# Patient Record
Sex: MALE | Race: OTHER | Employment: FULL TIME | URBAN - METROPOLITAN AREA
[De-identification: names, ages, dates, MRNs, and addresses within clinical notes are randomized per-mention and may not be internally consistent; named-entity substitution may affect disease eponyms.]

---

## 2017-01-04 NOTE — TELEPHONE ENCOUNTER
LOV  08/30/2016    Last refill    allopurinol 300 MG Oral Tab 90 tablet 1 8/2/2016       Sig :  Take 1 tablet (300 mg total) by mouth once daily.       Route:   Oral       Medication pending. Please advise. Denys Mendoza No future appointments.

## 2017-01-06 RX ORDER — ALLOPURINOL 300 MG/1
TABLET ORAL
Qty: 90 TABLET | Refills: 0 | Status: SHIPPED | OUTPATIENT
Start: 2017-01-06 | End: 2017-10-27

## 2017-02-07 RX ORDER — METHYLPREDNISOLONE 4 MG/1
TABLET ORAL
Qty: 21 TABLET | Refills: 0 | OUTPATIENT
Start: 2017-02-07

## 2017-02-07 NOTE — TELEPHONE ENCOUNTER
Pt stated he was not currently sick and did not need this medication refilled. He stated this request must have been made in error.

## 2017-02-28 RX ORDER — TRAZODONE HYDROCHLORIDE 50 MG/1
TABLET ORAL
Qty: 90 TABLET | Refills: 0 | Status: SHIPPED | OUTPATIENT
Start: 2017-02-28 | End: 2017-10-27

## 2017-02-28 NOTE — TELEPHONE ENCOUNTER
LOV: 8/30/16 for general medical exam    TRAZODONE HCL 50 MG Oral Tab 90 tablet 0 12/8/2016       Sig :  TAKE 1 TABLET BY MOUTH EVERY NIGHT       Route:   (none)       No future appointments. Please advise.

## 2017-10-27 ENCOUNTER — OFFICE VISIT (OUTPATIENT)
Dept: FAMILY MEDICINE CLINIC | Facility: CLINIC | Age: 57
End: 2017-10-27

## 2017-10-27 VITALS
HEIGHT: 70 IN | RESPIRATION RATE: 16 BRPM | SYSTOLIC BLOOD PRESSURE: 160 MMHG | OXYGEN SATURATION: 97 % | DIASTOLIC BLOOD PRESSURE: 84 MMHG | TEMPERATURE: 98 F | HEART RATE: 74 BPM | BODY MASS INDEX: 30.78 KG/M2 | WEIGHT: 215 LBS

## 2017-10-27 DIAGNOSIS — Z12.11 COLON CANCER SCREENING: ICD-10-CM

## 2017-10-27 DIAGNOSIS — Z00.00 GENERAL MEDICAL EXAM: Primary | ICD-10-CM

## 2017-10-27 PROCEDURE — 82306 VITAMIN D 25 HYDROXY: CPT | Performed by: FAMILY MEDICINE

## 2017-10-27 PROCEDURE — 80061 LIPID PANEL: CPT | Performed by: FAMILY MEDICINE

## 2017-10-27 PROCEDURE — 84153 ASSAY OF PSA TOTAL: CPT | Performed by: FAMILY MEDICINE

## 2017-10-27 PROCEDURE — 80050 GENERAL HEALTH PANEL: CPT | Performed by: FAMILY MEDICINE

## 2017-10-27 PROCEDURE — 99396 PREV VISIT EST AGE 40-64: CPT | Performed by: FAMILY MEDICINE

## 2017-10-27 RX ORDER — TRAZODONE HYDROCHLORIDE 50 MG/1
TABLET ORAL
Qty: 90 TABLET | Refills: 1 | Status: SHIPPED | OUTPATIENT
Start: 2017-10-27 | End: 2018-07-02

## 2017-10-27 RX ORDER — LISINOPRIL 20 MG/1
20 TABLET ORAL
Qty: 90 TABLET | Refills: 1 | Status: SHIPPED | OUTPATIENT
Start: 2017-10-27 | End: 2018-08-09

## 2017-10-27 RX ORDER — AMLODIPINE BESYLATE 5 MG/1
5 TABLET ORAL DAILY
Qty: 90 TABLET | Refills: 1 | Status: SHIPPED | OUTPATIENT
Start: 2017-10-27 | End: 2019-07-26

## 2017-10-27 RX ORDER — ALLOPURINOL 300 MG/1
300 TABLET ORAL
Qty: 90 TABLET | Refills: 1 | Status: SHIPPED | OUTPATIENT
Start: 2017-10-27 | End: 2018-08-09

## 2017-10-27 NOTE — PROGRESS NOTES
Haydee Sher is a 61year old male who presents for a complete physical exam.   HPI:   Pt needs refills on medication. There is no immunization history on file for this patient.   Wt Readings from Last 6 Encounters:  10/27/17 : 215 lb  08/30/16 watches minimally     REVIEW OF SYSTEMS:   GENERAL: feels well otherwise  SKIN: denies any unusual skin lesions  EYES:denies blurred vision or double vision  HEENT: denies nasal congestion, sinus pain or ST  LUNGS: denies shortness of breath with exertion year.

## 2017-12-18 ENCOUNTER — CHARTING TRANS (OUTPATIENT)
Dept: OCCUPATIONAL MEDICINE | Age: 57
End: 2017-12-18

## 2018-07-02 RX ORDER — TRAZODONE HYDROCHLORIDE 50 MG/1
TABLET ORAL
Qty: 90 TABLET | Refills: 0 | Status: SHIPPED | OUTPATIENT
Start: 2018-07-02 | End: 2018-08-09

## 2018-07-02 NOTE — TELEPHONE ENCOUNTER
LOV: 10/27/17 for general medical exam    TraZODone HCl 50 MG Oral Tab 90 tablet 1 10/27/2017    Sig :  TAKE 1 TABLET BY MOUTH EVERY NIGHT     Route:   (none)       No future appointments. Please advise.

## 2018-08-02 ENCOUNTER — TELEPHONE (OUTPATIENT)
Dept: FAMILY MEDICINE CLINIC | Facility: CLINIC | Age: 58
End: 2018-08-02

## 2018-08-02 NOTE — TELEPHONE ENCOUNTER
Pt called he stated [facility] will be contacting Dr. Jonathan Ogden regarding pt's sleep apnea equipment for the machine.

## 2018-08-06 NOTE — TELEPHONE ENCOUNTER
Received fax - order for sleep study supplies  Order requesting face to face notes and sleep study.   Informed patient this order has to go through the provider who completed the sleep study - we do not have any of the sleep study records  Patient states he

## 2018-08-09 ENCOUNTER — OFFICE VISIT (OUTPATIENT)
Dept: FAMILY MEDICINE CLINIC | Facility: CLINIC | Age: 58
End: 2018-08-09
Payer: COMMERCIAL

## 2018-08-09 VITALS
DIASTOLIC BLOOD PRESSURE: 100 MMHG | HEART RATE: 98 BPM | WEIGHT: 229 LBS | SYSTOLIC BLOOD PRESSURE: 146 MMHG | BODY MASS INDEX: 32.78 KG/M2 | OXYGEN SATURATION: 98 % | RESPIRATION RATE: 16 BRPM | HEIGHT: 70 IN | TEMPERATURE: 98 F

## 2018-08-09 DIAGNOSIS — I10 ESSENTIAL HYPERTENSION: Primary | ICD-10-CM

## 2018-08-09 DIAGNOSIS — G47.00 INSOMNIA, UNSPECIFIED TYPE: ICD-10-CM

## 2018-08-09 DIAGNOSIS — M1A.9XX0 CHRONIC GOUT WITHOUT TOPHUS, UNSPECIFIED CAUSE, UNSPECIFIED SITE: ICD-10-CM

## 2018-08-09 LAB
ALBUMIN SERPL-MCNC: 4.2 G/DL (ref 3.5–4.8)
ALBUMIN/GLOB SERPL: 1.1 {RATIO} (ref 1–2)
ALP LIVER SERPL-CCNC: 116 U/L (ref 45–117)
ALT SERPL-CCNC: 39 U/L (ref 17–63)
ANION GAP SERPL CALC-SCNC: 7 MMOL/L (ref 0–18)
AST SERPL-CCNC: 33 U/L (ref 15–41)
BILIRUB SERPL-MCNC: 0.4 MG/DL (ref 0.1–2)
BUN BLD-MCNC: 22 MG/DL (ref 8–20)
BUN/CREAT SERPL: 17.9 (ref 10–20)
CALCIUM BLD-MCNC: 9.2 MG/DL (ref 8.3–10.3)
CHLORIDE SERPL-SCNC: 105 MMOL/L (ref 101–111)
CO2 SERPL-SCNC: 25 MMOL/L (ref 22–32)
CREAT BLD-MCNC: 1.23 MG/DL (ref 0.7–1.3)
GLOBULIN PLAS-MCNC: 3.9 G/DL (ref 2.5–3.7)
GLUCOSE BLD-MCNC: 108 MG/DL (ref 70–99)
M PROTEIN MFR SERPL ELPH: 8.1 G/DL (ref 6.1–8.3)
OSMOLALITY SERPL CALC.SUM OF ELEC: 288 MOSM/KG (ref 275–295)
POTASSIUM SERPL-SCNC: 4.2 MMOL/L (ref 3.6–5.1)
SODIUM SERPL-SCNC: 137 MMOL/L (ref 136–144)
URATE SERPL-MCNC: 8.2 MG/DL (ref 2.4–8.7)

## 2018-08-09 PROCEDURE — 99214 OFFICE O/P EST MOD 30 MIN: CPT | Performed by: FAMILY MEDICINE

## 2018-08-09 PROCEDURE — 84550 ASSAY OF BLOOD/URIC ACID: CPT | Performed by: FAMILY MEDICINE

## 2018-08-09 PROCEDURE — 36415 COLL VENOUS BLD VENIPUNCTURE: CPT | Performed by: FAMILY MEDICINE

## 2018-08-09 PROCEDURE — 80053 COMPREHEN METABOLIC PANEL: CPT | Performed by: FAMILY MEDICINE

## 2018-08-09 RX ORDER — AMLODIPINE BESYLATE 5 MG/1
5 TABLET ORAL DAILY
Qty: 90 TABLET | Refills: 1 | Status: SHIPPED | OUTPATIENT
Start: 2018-08-09 | End: 2019-07-26

## 2018-08-09 RX ORDER — LISINOPRIL 20 MG/1
20 TABLET ORAL
Qty: 90 TABLET | Refills: 1 | Status: SHIPPED | OUTPATIENT
Start: 2018-08-09 | End: 2019-07-26

## 2018-08-09 RX ORDER — TRAZODONE HYDROCHLORIDE 50 MG/1
TABLET ORAL
Qty: 90 TABLET | Refills: 1 | Status: SHIPPED | OUTPATIENT
Start: 2018-08-09 | End: 2019-02-01

## 2018-08-09 RX ORDER — ALLOPURINOL 300 MG/1
300 TABLET ORAL
Qty: 90 TABLET | Refills: 1 | Status: SHIPPED | OUTPATIENT
Start: 2018-08-09 | End: 2019-07-26

## 2018-08-09 NOTE — PROGRESS NOTES
CC: meds check    HPI:     HTN: chronic, stable, albeit uncontrolled at times. Gout: chronic, stable. No major flares. Insomnia: chronic, stable. Uses the trazodone with good relief.      ROS: no cp or sob, no edema      Past Medical History:   Skyla TAKE 1 TABLET BY MOUTH EVERY NIGHT  Dispense: 90 tablet; Refill: 1  - lisinopril 20 MG Oral Tab; Take 1 tablet (20 mg total) by mouth once daily. Dispense: 90 tablet; Refill: 1  - allopurinol 300 MG Oral Tab;  Take 1 tablet (300 mg total) by mouth once paulo

## 2018-08-14 ENCOUNTER — TELEPHONE (OUTPATIENT)
Dept: FAMILY MEDICINE CLINIC | Facility: CLINIC | Age: 58
End: 2018-08-14

## 2018-09-25 ENCOUNTER — TELEPHONE (OUTPATIENT)
Dept: FAMILY MEDICINE CLINIC | Facility: CLINIC | Age: 58
End: 2018-09-25

## 2018-09-27 ENCOUNTER — CHARTING TRANS (OUTPATIENT)
Dept: OTHER | Age: 58
End: 2018-09-27

## 2018-09-27 ASSESSMENT — PAIN SCALES - GENERAL: PAINLEVEL_OUTOF10: 0

## 2018-11-27 VITALS
WEIGHT: 215 LBS | BODY MASS INDEX: 30.1 KG/M2 | HEART RATE: 82 BPM | DIASTOLIC BLOOD PRESSURE: 94 MMHG | SYSTOLIC BLOOD PRESSURE: 148 MMHG | TEMPERATURE: 96.3 F | HEIGHT: 71 IN

## 2019-02-01 DIAGNOSIS — I10 ESSENTIAL HYPERTENSION: ICD-10-CM

## 2019-02-01 DIAGNOSIS — M1A.9XX0 CHRONIC GOUT WITHOUT TOPHUS, UNSPECIFIED CAUSE, UNSPECIFIED SITE: ICD-10-CM

## 2019-02-01 RX ORDER — TRAZODONE HYDROCHLORIDE 50 MG/1
TABLET ORAL
Qty: 90 TABLET | Refills: 0 | Status: SHIPPED | OUTPATIENT
Start: 2019-02-01 | End: 2019-07-26

## 2019-02-01 NOTE — TELEPHONE ENCOUNTER
LOV: 8/9/18 for HTN    TraZODone HCl 50 MG Oral Tab 90 tablet 1 8/9/2018    Sig :  TAKE 1 TABLET BY MOUTH EVERY NIGHT     Route:   (none)       No future appointments. Please advise.

## 2019-03-05 VITALS
OXYGEN SATURATION: 95 % | SYSTOLIC BLOOD PRESSURE: 151 MMHG | RESPIRATION RATE: 18 BRPM | HEART RATE: 94 BPM | TEMPERATURE: 98 F | DIASTOLIC BLOOD PRESSURE: 79 MMHG

## 2019-03-06 ENCOUNTER — PATIENT OUTREACH (OUTPATIENT)
Dept: FAMILY MEDICINE CLINIC | Facility: CLINIC | Age: 59
End: 2019-03-06

## 2019-05-15 DIAGNOSIS — I10 ESSENTIAL HYPERTENSION: ICD-10-CM

## 2019-05-15 DIAGNOSIS — M1A.9XX0 CHRONIC GOUT WITHOUT TOPHUS, UNSPECIFIED CAUSE, UNSPECIFIED SITE: ICD-10-CM

## 2019-05-16 DIAGNOSIS — M1A.9XX0 CHRONIC GOUT WITHOUT TOPHUS, UNSPECIFIED CAUSE, UNSPECIFIED SITE: ICD-10-CM

## 2019-05-16 DIAGNOSIS — I10 ESSENTIAL HYPERTENSION: ICD-10-CM

## 2019-05-17 RX ORDER — TRAZODONE HYDROCHLORIDE 50 MG/1
TABLET ORAL
Qty: 90 TABLET | Refills: 0 | OUTPATIENT
Start: 2019-05-17

## 2019-05-17 RX ORDER — LISINOPRIL 20 MG/1
TABLET ORAL
Qty: 90 TABLET | Refills: 0 | OUTPATIENT
Start: 2019-05-17

## 2019-05-26 NOTE — TELEPHONE ENCOUNTER
Left message regarding paperwork patient dropped off for cpap supplies. Patient needs to get office notes and sleep study results from the doctor who did sleep study.      We cannot complete these forms     Order needs to be done by that doctor
Per patient, forms have already been competed, his CPAP machine being delivered tomorrow.
Pt dropped off paperwork needing completion for CPAP supplies.
chest pain

## 2019-07-10 ENCOUNTER — PATIENT OUTREACH (OUTPATIENT)
Dept: FAMILY MEDICINE CLINIC | Facility: CLINIC | Age: 59
End: 2019-07-10

## 2019-07-10 NOTE — PROGRESS NOTES
This patient is on Dr. David Velazquez hypertension list. Steve Carcamo the patient is due for a follow up with Dr. Lisa Matthews, please call to schedule.  If the patient is due for a nurse appointment for a b/p check, please contact the patient to schedule.  Please send the c

## 2019-07-26 ENCOUNTER — OFFICE VISIT (OUTPATIENT)
Dept: FAMILY MEDICINE CLINIC | Facility: CLINIC | Age: 59
End: 2019-07-26
Payer: COMMERCIAL

## 2019-07-26 VITALS
RESPIRATION RATE: 16 BRPM | HEART RATE: 72 BPM | DIASTOLIC BLOOD PRESSURE: 88 MMHG | WEIGHT: 217 LBS | TEMPERATURE: 98 F | SYSTOLIC BLOOD PRESSURE: 130 MMHG | BODY MASS INDEX: 31.07 KG/M2 | HEIGHT: 70 IN

## 2019-07-26 DIAGNOSIS — Z00.00 GENERAL MEDICAL EXAM: Primary | ICD-10-CM

## 2019-07-26 DIAGNOSIS — I10 ESSENTIAL HYPERTENSION: ICD-10-CM

## 2019-07-26 DIAGNOSIS — M1A.9XX0 CHRONIC GOUT WITHOUT TOPHUS, UNSPECIFIED CAUSE, UNSPECIFIED SITE: ICD-10-CM

## 2019-07-26 LAB
ALBUMIN SERPL-MCNC: 4.1 G/DL (ref 3.4–5)
ALBUMIN/GLOB SERPL: 1 {RATIO} (ref 1–2)
ALP LIVER SERPL-CCNC: 85 U/L (ref 45–117)
ALT SERPL-CCNC: 57 U/L (ref 16–61)
ANION GAP SERPL CALC-SCNC: 7 MMOL/L (ref 0–18)
AST SERPL-CCNC: 42 U/L (ref 15–37)
BILIRUB SERPL-MCNC: 1 MG/DL (ref 0.1–2)
BUN BLD-MCNC: 20 MG/DL (ref 7–18)
BUN/CREAT SERPL: 15.6 (ref 10–20)
CALCIUM BLD-MCNC: 9.7 MG/DL (ref 8.5–10.1)
CHLORIDE SERPL-SCNC: 103 MMOL/L (ref 98–112)
CHOLEST SMN-MCNC: 154 MG/DL (ref ?–200)
CK SERPL-CCNC: 141 U/L (ref 39–308)
CO2 SERPL-SCNC: 27 MMOL/L (ref 21–32)
COMPLEXED PSA SERPL-MCNC: 1.63 NG/ML (ref ?–4)
CREAT BLD-MCNC: 1.28 MG/DL (ref 0.7–1.3)
DEPRECATED RDW RBC AUTO: 44.5 FL (ref 35.1–46.3)
ERYTHROCYTE [DISTWIDTH] IN BLOOD BY AUTOMATED COUNT: 12.8 % (ref 11–15)
EST. AVERAGE GLUCOSE BLD GHB EST-MCNC: 103 MG/DL (ref 68–126)
GLOBULIN PLAS-MCNC: 4.3 G/DL (ref 2.8–4.4)
GLUCOSE BLD-MCNC: 82 MG/DL (ref 70–99)
HBA1C MFR BLD HPLC: 5.2 % (ref ?–5.7)
HCT VFR BLD AUTO: 50.7 % (ref 39–53)
HDLC SERPL-MCNC: 73 MG/DL (ref 40–59)
HGB BLD-MCNC: 17.3 G/DL (ref 13–17.5)
LDLC SERPL CALC-MCNC: 67 MG/DL (ref ?–100)
M PROTEIN MFR SERPL ELPH: 8.4 G/DL (ref 6.4–8.2)
MCH RBC QN AUTO: 32.5 PG (ref 26–34)
MCHC RBC AUTO-ENTMCNC: 34.1 G/DL (ref 31–37)
MCV RBC AUTO: 95.1 FL (ref 80–100)
NONHDLC SERPL-MCNC: 81 MG/DL (ref ?–130)
OSMOLALITY SERPL CALC.SUM OF ELEC: 286 MOSM/KG (ref 275–295)
PLATELET # BLD AUTO: 179 10(3)UL (ref 150–450)
POTASSIUM SERPL-SCNC: 4 MMOL/L (ref 3.5–5.1)
RBC # BLD AUTO: 5.33 X10(6)UL (ref 4.3–5.7)
SODIUM SERPL-SCNC: 137 MMOL/L (ref 136–145)
TRIGL SERPL-MCNC: 72 MG/DL (ref 30–149)
TSI SER-ACNC: 1.24 MIU/ML (ref 0.36–3.74)
VIT D+METAB SERPL-MCNC: 26.3 NG/ML (ref 30–100)
VLDLC SERPL CALC-MCNC: 14 MG/DL (ref 0–30)
WBC # BLD AUTO: 7.2 X10(3) UL (ref 4–11)

## 2019-07-26 PROCEDURE — 84153 ASSAY OF PSA TOTAL: CPT | Performed by: FAMILY MEDICINE

## 2019-07-26 PROCEDURE — 83036 HEMOGLOBIN GLYCOSYLATED A1C: CPT | Performed by: FAMILY MEDICINE

## 2019-07-26 PROCEDURE — 82550 ASSAY OF CK (CPK): CPT | Performed by: FAMILY MEDICINE

## 2019-07-26 PROCEDURE — 80050 GENERAL HEALTH PANEL: CPT | Performed by: FAMILY MEDICINE

## 2019-07-26 PROCEDURE — 80061 LIPID PANEL: CPT | Performed by: FAMILY MEDICINE

## 2019-07-26 PROCEDURE — 82306 VITAMIN D 25 HYDROXY: CPT | Performed by: FAMILY MEDICINE

## 2019-07-26 PROCEDURE — 99396 PREV VISIT EST AGE 40-64: CPT | Performed by: FAMILY MEDICINE

## 2019-07-26 RX ORDER — LISINOPRIL 20 MG/1
20 TABLET ORAL
Qty: 90 TABLET | Refills: 1 | Status: SHIPPED | OUTPATIENT
Start: 2019-07-26 | End: 2020-01-24

## 2019-07-26 RX ORDER — TRAZODONE HYDROCHLORIDE 50 MG/1
TABLET ORAL
Qty: 90 TABLET | Refills: 1 | Status: SHIPPED | OUTPATIENT
Start: 2019-07-26 | End: 2020-01-24

## 2019-07-26 RX ORDER — AMLODIPINE BESYLATE 5 MG/1
5 TABLET ORAL DAILY
Qty: 90 TABLET | Refills: 1 | Status: SHIPPED | OUTPATIENT
Start: 2019-07-26 | End: 2020-01-24

## 2019-07-26 RX ORDER — ALLOPURINOL 300 MG/1
300 TABLET ORAL
Qty: 90 TABLET | Refills: 1 | Status: SHIPPED | OUTPATIENT
Start: 2019-07-26 | End: 2020-01-30 | Stop reason: ALTCHOICE

## 2019-07-26 NOTE — PROGRESS NOTES
Kisha Rodrigez is a 60year old male who presents for a complete physical exam.   HPI:   Pt generally doing well. No complaints. There is no immunization history on file for this patient.   Wt Readings from Last 6 Encounters:  07/26/19 : 217 lb  08/ MG Oral Tab Take 1 tablet (5 mg total) by mouth daily. Disp: 90 tablet Rfl: 1      Past Medical History:   Diagnosis Date   • Allergic conjunctivitis    • Gout    • Unspecified essential hypertension       History reviewed. No pertinent surgical history. times three,cranial nerves are intact,motor and sensory are grossly intact    ASSESSMENT AND PLAN:   Argenis Rodriguez is a 60year old male who presents for a complete physical exam. Pt's BMI is Body mass index is 31.14 kg/m². , recommended low sugar diet

## 2019-10-17 ENCOUNTER — OFFICE VISIT (OUTPATIENT)
Dept: FAMILY MEDICINE CLINIC | Facility: CLINIC | Age: 59
End: 2019-10-17
Payer: COMMERCIAL

## 2019-10-17 VITALS
RESPIRATION RATE: 18 BRPM | OXYGEN SATURATION: 98 % | BODY MASS INDEX: 31.35 KG/M2 | WEIGHT: 219 LBS | DIASTOLIC BLOOD PRESSURE: 86 MMHG | HEIGHT: 70 IN | HEART RATE: 75 BPM | TEMPERATURE: 98 F | SYSTOLIC BLOOD PRESSURE: 134 MMHG

## 2019-10-17 DIAGNOSIS — M70.52 INFRAPATELLAR BURSITIS OF LEFT KNEE: ICD-10-CM

## 2019-10-17 DIAGNOSIS — R74.8 ELEVATED LIVER ENZYMES: Primary | ICD-10-CM

## 2019-10-17 DIAGNOSIS — M1A.9XX0 CHRONIC GOUT WITHOUT TOPHUS, UNSPECIFIED CAUSE, UNSPECIFIED SITE: ICD-10-CM

## 2019-10-17 PROCEDURE — 99214 OFFICE O/P EST MOD 30 MIN: CPT | Performed by: FAMILY MEDICINE

## 2019-10-17 NOTE — PROGRESS NOTES
CC: Multiple issues    HPI:     1. Elevated liver enzymes  Patient has little elevated liver enzymes. Mild elevation. This is the first time since I am seeing him since 2015 that they have been elevated.   He admits to increased alcohol use over the last hypertension        Social History    Tobacco Use      Smoking status: Never Smoker      Smokeless tobacco: Never Used    Alcohol use: Yes      Frequency: 2-3 times a week      Drinks per session: 1 or 2      Binge frequency: Never    Drug use: No      EXA

## 2019-10-28 ENCOUNTER — TELEPHONE (OUTPATIENT)
Dept: FAMILY MEDICINE CLINIC | Facility: CLINIC | Age: 59
End: 2019-10-28

## 2019-10-28 RX ORDER — INDOMETHACIN 50 MG/1
50 CAPSULE ORAL 2 TIMES DAILY WITH MEALS
Qty: 10 CAPSULE | Refills: 1 | Status: SHIPPED | OUTPATIENT
Start: 2019-10-28 | End: 2020-01-10

## 2019-10-28 NOTE — TELEPHONE ENCOUNTER
LOV 10/17/19 for gout. Called pt to get more information. Pt states he takes this PRN for a gout flare. He does not currently have symptoms. He has never taken this Rx before. Please advise.     Future Appointments   Date Time Provider Department Jayson

## 2019-10-28 NOTE — TELEPHONE ENCOUNTER
Pt called stated stated he needs a rx for gout it is called Indomethacin. He stated Dr. Madeline Stewart would prescribe this for him. Would like [facility] [address] [place].

## 2019-11-02 RX ORDER — INDOMETHACIN 50 MG/1
CAPSULE ORAL
Qty: 10 CAPSULE | Refills: 0 | OUTPATIENT
Start: 2019-11-02

## 2019-11-04 ENCOUNTER — HOSPITAL ENCOUNTER (OUTPATIENT)
Dept: ULTRASOUND IMAGING | Age: 59
Discharge: HOME | End: 2019-11-04
Attending: FAMILY MEDICINE
Payer: COMMERCIAL

## 2019-11-04 DIAGNOSIS — R74.8 ELEVATED LIVER ENZYMES: ICD-10-CM

## 2019-11-04 PROCEDURE — 76700 US EXAM ABDOM COMPLETE: CPT | Performed by: FAMILY MEDICINE

## 2019-11-05 ENCOUNTER — TELEPHONE (OUTPATIENT)
Dept: FAMILY MEDICINE CLINIC | Facility: CLINIC | Age: 59
End: 2019-11-05

## 2019-11-05 NOTE — TELEPHONE ENCOUNTER
----- Message from Felipe Weller DO sent at 11/5/2019  7:50 AM CST -----  Results reviewed. Please inform patient ultrasound confirms fatty liver. Needs lower carbohydrate diet and increased exercise for weight loss of 10% over the next year.   Also some g

## 2019-11-19 ENCOUNTER — OFFICE VISIT (OUTPATIENT)
Dept: SURGERY | Facility: CLINIC | Age: 59
End: 2019-11-19
Payer: COMMERCIAL

## 2019-11-19 VITALS — HEART RATE: 78 BPM | TEMPERATURE: 98 F | WEIGHT: 219 LBS | HEIGHT: 70 IN | BODY MASS INDEX: 31.35 KG/M2

## 2019-11-19 DIAGNOSIS — Z12.11 SCREEN FOR COLON CANCER: Primary | ICD-10-CM

## 2019-11-19 PROCEDURE — S0285 CNSLT BEFORE SCREEN COLONOSC: HCPCS | Performed by: SURGERY

## 2019-11-19 NOTE — H&P
Lucinda Love is a 60year old male  Patient presents with:  Colonoscopy: New patient referred by Dr. Waqas Saavedra for colonoscopy consult. Pt has never had a colonoscopy. Denies any family hx colon cancer or polyps.        REFERRED BY    Patient presents w constipation, diarrhea; no rectal bleeding; no heartburn  GENITAL/: no dysuria, urgency or frequency, no tea colored urine  MUSCULOSKELETAL: no joint complaints upper or lower extremities  HEMATOLOGY: denies hx anemia; denies bruising or excessive bleedi Final   • Anion Gap 07/26/2019 7  0 - 18 mmol/L Final   • BUN 07/26/2019 20* 7 - 18 mg/dL Final   • Creatinine 07/26/2019 1.28  0.70 - 1.30 mg/dL Final   • BUN/CREA Ratio 07/26/2019 15.6  10.0 - 20.0 Final   • Calcium, Total 07/26/2019 9.7  8.5 - 10.1 mg/d mg/dL  Very High: >=500 mg/dL         • LDL Cholesterol 07/26/2019 67  <100 mg/dL Final      Desirable <100 mg/dL   Borderline 100-129 mg/dL   High     >=130mg/dL       • VLDL 07/26/2019 14  0 - 30 mg/dL Final   • Non HDL Chol 07/26/2019 81  <130 mg/dL Fin supplementation to stop biotin consumption at least 72 hours prior to collection of a new sample.    • Vitamin D, 25OH, Total 07/26/2019 26.3* 30.0 - 100.0 ng/mL Final      Literature Recommendations for 25(OH)D levels are:  Range           Vitamin D Status

## 2019-12-20 ENCOUNTER — LAB REQUISITION (OUTPATIENT)
Dept: LAB | Facility: HOSPITAL | Age: 59
End: 2019-12-20
Payer: COMMERCIAL

## 2019-12-20 ENCOUNTER — TELEPHONE (OUTPATIENT)
Dept: SURGERY | Facility: CLINIC | Age: 59
End: 2019-12-20

## 2019-12-20 DIAGNOSIS — K38.8 MASS OF APPENDIX: Primary | ICD-10-CM

## 2019-12-20 DIAGNOSIS — Z12.11 ENCOUNTER FOR SCREENING FOR MALIGNANT NEOPLASM OF COLON: ICD-10-CM

## 2019-12-20 PROCEDURE — 88305 TISSUE EXAM BY PATHOLOGIST: CPT | Performed by: SURGERY

## 2019-12-20 NOTE — TELEPHONE ENCOUNTER
Pt with invaginated appendix noted at time of colonoscopy   I am concerned Re poss mucocele of the appendix

## 2020-01-02 ENCOUNTER — TELEPHONE (OUTPATIENT)
Dept: SURGERY | Facility: CLINIC | Age: 60
End: 2020-01-02

## 2020-01-02 NOTE — TELEPHONE ENCOUNTER
CT Abdomen + Pelvis CPT 49486 approved by insurance. [address] Baltazar #[id number]    Called patient to inform him. Voicemail full, unable to leave msg.

## 2020-01-03 NOTE — PROGRESS NOTES
Called pt and no answer, unable to leave message. CT order has been placed by another RN and approved by insurance. Pt needs to be contacted and needs to complete before it expires.

## 2020-01-09 ENCOUNTER — TELEPHONE (OUTPATIENT)
Dept: FAMILY MEDICINE CLINIC | Facility: CLINIC | Age: 60
End: 2020-01-09

## 2020-01-09 NOTE — TELEPHONE ENCOUNTER
Refill request received yesterday for this medication and routed to [facility] to address.

## 2020-01-09 NOTE — TELEPHONE ENCOUNTER
Last refilled on 10/28/19 for # 10 with 1 refills  Last OV 10/17/19 for elevated LFTs  No future appointments. Thank you.

## 2020-01-10 RX ORDER — INDOMETHACIN 50 MG/1
CAPSULE ORAL
Qty: 10 CAPSULE | Refills: 1 | OUTPATIENT
Start: 2020-01-10

## 2020-01-10 RX ORDER — INDOMETHACIN 50 MG/1
CAPSULE ORAL
Qty: 10 CAPSULE | Refills: 1 | Status: SHIPPED | OUTPATIENT
Start: 2020-01-10 | End: 2020-05-19

## 2020-01-10 NOTE — TELEPHONE ENCOUNTER
LOV: 10/17/19 for chronic gout / elevated liver enzymes     indomethacin 50 MG Oral Cap () 10 capsule 1 10/28/2019 2019   Sig:   Take 1 capsule (50 mg total) by mouth 2 (two) times daily with meals for 5 days.  As needed for gout flare up sympto

## 2020-01-14 RX ORDER — INDOMETHACIN 50 MG/1
CAPSULE ORAL
Qty: 10 CAPSULE | Refills: 1 | OUTPATIENT
Start: 2020-01-14

## 2020-01-14 NOTE — TELEPHONE ENCOUNTER
Indomethacin refilled 1/10/20 #10 with 1 refill.  Need to call pharmacy when they open to see if recieved

## 2020-01-14 NOTE — TELEPHONE ENCOUNTER
Called [facility] and was advised that this was received and 1 refill left on file.  Refusing this request.

## 2020-01-20 ENCOUNTER — TELEPHONE (OUTPATIENT)
Dept: SURGERY | Facility: CLINIC | Age: 60
End: 2020-01-20

## 2020-01-20 NOTE — PROGRESS NOTES
Left detailed msg that CT order was placed, that doctor feels this is an important test and that it is authorized until 2/16/20

## 2020-01-24 ENCOUNTER — HOSPITAL ENCOUNTER (OUTPATIENT)
Dept: CT IMAGING | Age: 60
Discharge: HOME | End: 2020-01-24
Attending: SURGERY
Payer: COMMERCIAL

## 2020-01-24 ENCOUNTER — TELEPHONE (OUTPATIENT)
Dept: SURGERY | Facility: CLINIC | Age: 60
End: 2020-01-24

## 2020-01-24 DIAGNOSIS — K38.8 MASS OF APPENDIX: ICD-10-CM

## 2020-01-24 DIAGNOSIS — I10 ESSENTIAL HYPERTENSION: ICD-10-CM

## 2020-01-24 DIAGNOSIS — M1A.9XX0 CHRONIC GOUT WITHOUT TOPHUS, UNSPECIFIED CAUSE, UNSPECIFIED SITE: ICD-10-CM

## 2020-01-24 LAB — CREAT BLD-MCNC: 1.1 MG/DL (ref 0.7–1.3)

## 2020-01-24 PROCEDURE — 82565 ASSAY OF CREATININE: CPT

## 2020-01-24 PROCEDURE — 74177 CT ABD & PELVIS W/CONTRAST: CPT | Performed by: SURGERY

## 2020-01-24 RX ORDER — AMLODIPINE BESYLATE 5 MG/1
TABLET ORAL
Qty: 90 TABLET | Refills: 1 | Status: SHIPPED | OUTPATIENT
Start: 2020-01-24 | End: 2020-01-30 | Stop reason: ALTCHOICE

## 2020-01-24 RX ORDER — TRAZODONE HYDROCHLORIDE 50 MG/1
TABLET ORAL
Qty: 90 TABLET | Refills: 0 | Status: SHIPPED | OUTPATIENT
Start: 2020-01-24 | End: 2020-05-19

## 2020-01-24 RX ORDER — LISINOPRIL 20 MG/1
TABLET ORAL
Qty: 90 TABLET | Refills: 1 | Status: SHIPPED | OUTPATIENT
Start: 2020-01-24 | End: 2020-05-19

## 2020-01-24 NOTE — TELEPHONE ENCOUNTER
LOV: 10/17/19 for elevated liver enzymes  Patient passes protocol for HTN meds  Rx filled. CMP: 7/26/19     amLODIPine Besylate 5 MG Oral Tab 90 tablet 1 7/26/2019    Sig:   Take 1 tablet (5 mg total) by mouth daily.        lisinopril 20 MG Oral Tab 90 tab

## 2020-01-24 NOTE — TELEPHONE ENCOUNTER
LOV: 10/17/19 for elevated liver enzymes    traZODone HCl 50 MG Oral Tab 90 tablet 1 2019    Si tab po nightly       Future Appointments   Date Time Provider Sherly Encarnacion   2020  3:00 PM OS CT RM 1 OS CT [place]     Please advise.

## 2020-01-24 NOTE — TELEPHONE ENCOUNTER
Insurance verification left message with MA regarding prior authorization needed. Referral updated via [employer]. Copy printed and placed in scanning.     Authorization Number: [id number]  Case Number: [id number]  Status: Approved  Approval Date: 1/2/2020 1

## 2020-01-28 ENCOUNTER — TELEPHONE (OUTPATIENT)
Dept: SURGERY | Facility: CLINIC | Age: 60
End: 2020-01-28

## 2020-01-29 ENCOUNTER — TELEPHONE (OUTPATIENT)
Dept: SURGERY | Facility: CLINIC | Age: 60
End: 2020-01-29

## 2020-01-29 DIAGNOSIS — K38.8 MASS OF APPENDIX: Primary | ICD-10-CM

## 2020-01-31 ENCOUNTER — TELEPHONE (OUTPATIENT)
Dept: SURGERY | Facility: CLINIC | Age: 60
End: 2020-01-31

## 2020-01-31 NOTE — TELEPHONE ENCOUNTER
Called pt and went over surgery checklist for upcoming procedure with RWM on 2/7/2020 at [facility] for lap appey. V/U and no further questions.

## 2020-02-03 ENCOUNTER — TELEPHONE (OUTPATIENT)
Dept: FAMILY MEDICINE CLINIC | Facility: CLINIC | Age: 60
End: 2020-02-03

## 2020-02-03 NOTE — TELEPHONE ENCOUNTER
Hello!  I am wondering if this patient will need a pre-op physical in our office prior to his upcoming procedure? I was not sure based on the fax we received. Thank you!

## 2020-02-04 ENCOUNTER — TELEPHONE (OUTPATIENT)
Dept: FAMILY MEDICINE CLINIC | Facility: CLINIC | Age: 60
End: 2020-02-04

## 2020-02-04 ENCOUNTER — MED REC SCAN ONLY (OUTPATIENT)
Dept: SURGERY | Facility: CLINIC | Age: 60
End: 2020-02-04

## 2020-02-04 NOTE — TELEPHONE ENCOUNTER
Pt is scheduled for surgery on 2/7,   Ashli Cruz @ [facility] preadmin needs labs &  EKG states she cant see in Epic, please fax to    [phone]  . [phone]

## 2020-02-04 NOTE — TELEPHONE ENCOUNTER
Nestor Blair from Dr Debbie Bradford office, we are not requiring any additional testing or medical clearance. However I do see a letter from Pre Admission Testing requiring some testing per PAT/Anesthesia.  The letter asks for :    EKG READ AND SIGNED WITHIN 90 days

## 2020-02-04 NOTE — TELEPHONE ENCOUNTER
See tel enc from 2/3/20. Call to patient and advised to call:    [facility] Phone: [phone]  Fax: [phone]    Verbalized understanding.

## 2020-02-05 NOTE — TELEPHONE ENCOUNTER
Do you want to see him prior to this procedure? If not, schedule an RN visit here for the labs and EKG or have pt go to [facility]?

## 2020-02-06 ENCOUNTER — APPOINTMENT (OUTPATIENT)
Dept: LAB | Age: 60
End: 2020-02-06
Attending: SURGERY
Payer: COMMERCIAL

## 2020-02-06 ENCOUNTER — ANESTHESIA EVENT (OUTPATIENT)
Dept: SURGERY | Facility: HOSPITAL | Age: 60
End: 2020-02-06
Payer: COMMERCIAL

## 2020-02-06 DIAGNOSIS — K38.8 MASS OF APPENDIX: ICD-10-CM

## 2020-02-06 LAB
ANION GAP SERPL CALC-SCNC: 7 MMOL/L (ref 0–18)
ATRIAL RATE: 72 BPM
BUN BLD-MCNC: 15 MG/DL (ref 7–18)
BUN/CREAT SERPL: 13.3 (ref 10–20)
CALCIUM BLD-MCNC: 8.9 MG/DL (ref 8.5–10.1)
CHLORIDE SERPL-SCNC: 106 MMOL/L (ref 98–112)
CO2 SERPL-SCNC: 23 MMOL/L (ref 21–32)
CREAT BLD-MCNC: 1.13 MG/DL (ref 0.7–1.3)
GLUCOSE BLD-MCNC: 102 MG/DL (ref 70–99)
OSMOLALITY SERPL CALC.SUM OF ELEC: 283 MOSM/KG (ref 275–295)
P-R INTERVAL: 164 MS
PATIENT FASTING Y/N/NP: NO
POTASSIUM SERPL-SCNC: 3.9 MMOL/L (ref 3.5–5.1)
Q-T INTERVAL: 388 MS
QRS DURATION: 88 MS
QTC CALCULATION (BEZET): 424 MS
R AXIS: 201 DEGREES
SODIUM SERPL-SCNC: 136 MMOL/L (ref 136–145)
T AXIS: 67 DEGREES
VENTRICULAR RATE: 72 BPM

## 2020-02-06 PROCEDURE — 80048 BASIC METABOLIC PNL TOTAL CA: CPT

## 2020-02-06 PROCEDURE — 36415 COLL VENOUS BLD VENIPUNCTURE: CPT

## 2020-02-06 PROCEDURE — 93005 ELECTROCARDIOGRAM TRACING: CPT

## 2020-02-06 PROCEDURE — 93010 ELECTROCARDIOGRAM REPORT: CPT | Performed by: INTERNAL MEDICINE

## 2020-02-07 ENCOUNTER — HOSPITAL ENCOUNTER (OUTPATIENT)
Facility: HOSPITAL | Age: 60
Setting detail: HOSPITAL OUTPATIENT SURGERY
Discharge: HOME | End: 2020-02-07
Attending: SURGERY | Admitting: SURGERY
Payer: COMMERCIAL

## 2020-02-07 ENCOUNTER — ANESTHESIA (OUTPATIENT)
Dept: SURGERY | Facility: HOSPITAL | Age: 60
End: 2020-02-07
Payer: COMMERCIAL

## 2020-02-07 VITALS
BODY MASS INDEX: 29.72 KG/M2 | HEART RATE: 63 BPM | DIASTOLIC BLOOD PRESSURE: 79 MMHG | SYSTOLIC BLOOD PRESSURE: 140 MMHG | OXYGEN SATURATION: 96 % | WEIGHT: 212.31 LBS | HEIGHT: 71 IN | TEMPERATURE: 98 F | RESPIRATION RATE: 16 BRPM

## 2020-02-07 DIAGNOSIS — K38.8 MASS OF APPENDIX: Primary | ICD-10-CM

## 2020-02-07 PROCEDURE — 44970 LAPAROSCOPY APPENDECTOMY: CPT | Performed by: SURGERY

## 2020-02-07 PROCEDURE — 0DTJ4ZZ RESECTION OF APPENDIX, PERCUTANEOUS ENDOSCOPIC APPROACH: ICD-10-PCS | Performed by: SURGERY

## 2020-02-07 RX ORDER — SODIUM CHLORIDE, SODIUM LACTATE, POTASSIUM CHLORIDE, CALCIUM CHLORIDE 600; 310; 30; 20 MG/100ML; MG/100ML; MG/100ML; MG/100ML
INJECTION, SOLUTION INTRAVENOUS CONTINUOUS
Status: DISCONTINUED | OUTPATIENT
Start: 2020-02-07 | End: 2020-02-07

## 2020-02-07 RX ORDER — NALOXONE HYDROCHLORIDE 0.4 MG/ML
80 INJECTION, SOLUTION INTRAMUSCULAR; INTRAVENOUS; SUBCUTANEOUS AS NEEDED
Status: DISCONTINUED | OUTPATIENT
Start: 2020-02-07 | End: 2020-02-07

## 2020-02-07 RX ORDER — HYDROCODONE BITARTRATE AND ACETAMINOPHEN 5; 325 MG/1; MG/1
1 TABLET ORAL AS NEEDED
Status: COMPLETED | OUTPATIENT
Start: 2020-02-07 | End: 2020-02-07

## 2020-02-07 RX ORDER — HYDROMORPHONE HYDROCHLORIDE 1 MG/ML
0.4 INJECTION, SOLUTION INTRAMUSCULAR; INTRAVENOUS; SUBCUTANEOUS EVERY 5 MIN PRN
Status: DISCONTINUED | OUTPATIENT
Start: 2020-02-07 | End: 2020-02-07

## 2020-02-07 RX ORDER — HYDROCODONE BITARTRATE AND ACETAMINOPHEN 5; 325 MG/1; MG/1
1 TABLET ORAL EVERY 6 HOURS PRN
Qty: 20 TABLET | Refills: 0 | Status: SHIPPED | OUTPATIENT
Start: 2020-02-07 | End: 2020-02-11

## 2020-02-07 RX ORDER — HEPARIN SODIUM 5000 [USP'U]/ML
5000 INJECTION, SOLUTION INTRAVENOUS; SUBCUTANEOUS ONCE
Status: COMPLETED | OUTPATIENT
Start: 2020-02-07 | End: 2020-02-07

## 2020-02-07 RX ORDER — LIDOCAINE HYDROCHLORIDE 10 MG/ML
INJECTION, SOLUTION EPIDURAL; INFILTRATION; INTRACAUDAL; PERINEURAL AS NEEDED
Status: DISCONTINUED | OUTPATIENT
Start: 2020-02-07 | End: 2020-02-07 | Stop reason: SURG

## 2020-02-07 RX ORDER — NEOSTIGMINE METHYLSULFATE 1 MG/ML
INJECTION INTRAVENOUS AS NEEDED
Status: DISCONTINUED | OUTPATIENT
Start: 2020-02-07 | End: 2020-02-07 | Stop reason: SURG

## 2020-02-07 RX ORDER — ONDANSETRON 2 MG/ML
4 INJECTION INTRAMUSCULAR; INTRAVENOUS AS NEEDED
Status: DISCONTINUED | OUTPATIENT
Start: 2020-02-07 | End: 2020-02-07

## 2020-02-07 RX ORDER — BUPIVACAINE HYDROCHLORIDE 5 MG/ML
INJECTION, SOLUTION EPIDURAL; INTRACAUDAL AS NEEDED
Status: DISCONTINUED | OUTPATIENT
Start: 2020-02-07 | End: 2020-02-07 | Stop reason: HOSPADM

## 2020-02-07 RX ORDER — GLYCOPYRROLATE 0.2 MG/ML
INJECTION, SOLUTION INTRAMUSCULAR; INTRAVENOUS AS NEEDED
Status: DISCONTINUED | OUTPATIENT
Start: 2020-02-07 | End: 2020-02-07 | Stop reason: SURG

## 2020-02-07 RX ORDER — ACETAMINOPHEN 500 MG
1000 TABLET ORAL ONCE
Status: DISCONTINUED | OUTPATIENT
Start: 2020-02-07 | End: 2020-02-07 | Stop reason: HOSPADM

## 2020-02-07 RX ORDER — HYDROCODONE BITARTRATE AND ACETAMINOPHEN 5; 325 MG/1; MG/1
2 TABLET ORAL AS NEEDED
Status: COMPLETED | OUTPATIENT
Start: 2020-02-07 | End: 2020-02-07

## 2020-02-07 RX ORDER — ROCURONIUM BROMIDE 10 MG/ML
INJECTION, SOLUTION INTRAVENOUS AS NEEDED
Status: DISCONTINUED | OUTPATIENT
Start: 2020-02-07 | End: 2020-02-07 | Stop reason: SURG

## 2020-02-07 RX ORDER — ONDANSETRON 2 MG/ML
INJECTION INTRAMUSCULAR; INTRAVENOUS AS NEEDED
Status: DISCONTINUED | OUTPATIENT
Start: 2020-02-07 | End: 2020-02-07 | Stop reason: SURG

## 2020-02-07 RX ORDER — DEXAMETHASONE SODIUM PHOSPHATE 4 MG/ML
VIAL (ML) INJECTION AS NEEDED
Status: DISCONTINUED | OUTPATIENT
Start: 2020-02-07 | End: 2020-02-07 | Stop reason: SURG

## 2020-02-07 RX ADMIN — DEXAMETHASONE SODIUM PHOSPHATE 4 MG: 4 MG/ML VIAL (ML) INJECTION at 16:22:00

## 2020-02-07 RX ADMIN — LIDOCAINE HYDROCHLORIDE 50 MG: 10 INJECTION, SOLUTION EPIDURAL; INFILTRATION; INTRACAUDAL; PERINEURAL at 16:22:00

## 2020-02-07 RX ADMIN — SODIUM CHLORIDE, SODIUM LACTATE, POTASSIUM CHLORIDE, CALCIUM CHLORIDE: 600; 310; 30; 20 INJECTION, SOLUTION INTRAVENOUS at 17:21:00

## 2020-02-07 RX ADMIN — ROCURONIUM BROMIDE 50 MG: 10 INJECTION, SOLUTION INTRAVENOUS at 16:22:00

## 2020-02-07 RX ADMIN — NEOSTIGMINE METHYLSULFATE 3 MG: 1 INJECTION INTRAVENOUS at 17:04:00

## 2020-02-07 RX ADMIN — ONDANSETRON 4 MG: 2 INJECTION INTRAMUSCULAR; INTRAVENOUS at 17:00:00

## 2020-02-07 RX ADMIN — GLYCOPYRROLATE 0.4 MG: 0.2 INJECTION, SOLUTION INTRAMUSCULAR; INTRAVENOUS at 17:04:00

## 2020-02-07 NOTE — ANESTHESIA PREPROCEDURE EVALUATION
PRE-OP EVALUATION    Patient Name: Argenis Rodriguez    Pre-op Diagnosis: Mass of appendix [K38.8]    Procedure(s):  LAPAROSCOPIC APPENDECTOMY    Surgeon(s) and Role:     Annabella Whitehead, DO - Primary    Pre-op vitals reviewed.   Temp: 98.1 °F (36.7 °C)  P 02/06/2020            Airway      Mallampati: II  Mouth opening: 3 FB  TM distance: 4 - 6 cm  Neck ROM: full Cardiovascular      Rhythm: regular  Rate: normal     Dental             Pulmonary      Breath sounds clear to auscultation bilaterally.

## 2020-02-07 NOTE — H&P
Patient presents with request for colonoscopy. Patient denies change in bowel habits he denies blood or mucus in stool states that he has a bowel movement every day he denies laxative use he denies chronic abdominal pain he denies unexplained weight loss. anemia; denies bruising or excessive bleeding  Endocrine: no weight gain or loss no hot or cold intolerance     EXAM      There were no vitals taken for this visit. GENERAL: well developed, well nourished male, in no apparent distress.     MENTAL STATUS :A Final   • Calcium, Total 07/26/2019 9.7  8.5 - 10.1 mg/dL Final   • Calculated Osmolality 07/26/2019 286  275 - 295 mOsm/kg Final   • GFR, Non- 07/26/2019 60  >=60 Final   • GFR, -American 07/26/2019 70  >=60 Final   • AST 07/26/2019 LDL Cholesterol 07/26/2019 67  <100 mg/dL Final        Desirable <100 mg/dL   Borderline 100-129 mg/dL   High     >=130mg/dL         • VLDL 07/26/2019 14  0 - 30 mg/dL Final   • Non HDL Chol 07/26/2019 81  <130 mg/dL Final        Desirable  <130 mg/dL   Blane Arts consumption at least 72 hours prior to collection of a new sample.    • Vitamin D, 25OH, Total 07/26/2019 26.3* 30.0 - 100.0 ng/mL Final        Literature Recommendations for 25(OH)D levels are:  Range           Vitamin D Status   <20 ng/mL         Deficien

## 2020-02-07 NOTE — ANESTHESIA POSTPROCEDURE EVALUATION
[facility] Sherel November Patient Status:  Hospital Outpatient Surgery   Age/Gender 60year old male MRN [id number]   Location [address] [facility] Attending Stephie Ryan DO   Hosp Day # 0 PCP Alexander Rea DO       Anesth

## 2020-02-07 NOTE — H&P
Patient presents with request for colonoscopy. Patient denies change in bowel habits he denies blood or mucus in stool states that he has a bowel movement every day he denies laxative use he denies chronic abdominal pain he denies unexplained weight loss. anemia; denies bruising or excessive bleeding  Endocrine: no weight gain or loss no hot or cold intolerance     EXAM      There were no vitals taken for this visit. GENERAL: well developed, well nourished male, in no apparent distress.     MENTAL STATUS :A Final   • Calcium, Total 07/26/2019 9.7  8.5 - 10.1 mg/dL Final   • Calculated Osmolality 07/26/2019 286  275 - 295 mOsm/kg Final   • GFR, Non- 07/26/2019 60  >=60 Final   • GFR, -American 07/26/2019 70  >=60 Final   • AST 07/26/2019 LDL Cholesterol 07/26/2019 67  <100 mg/dL Final        Desirable <100 mg/dL   Borderline 100-129 mg/dL   High     >=130mg/dL         • VLDL 07/26/2019 14  0 - 30 mg/dL Final   • Non HDL Chol 07/26/2019 81  <130 mg/dL Final        Desirable  <130 mg/dL   Henrry Diehl consumption at least 72 hours prior to collection of a new sample.    • Vitamin D, 25OH, Total 07/26/2019 26.3* 30.0 - 100.0 ng/mL Final        Literature Recommendations for 25(OH)D levels are:  Range           Vitamin D Status   <20 ng/mL         Deficien

## 2020-02-07 NOTE — ANESTHESIA PROCEDURE NOTES
Airway  Date/Time: 2/7/2020 4:32 PM  Urgency: elective    Difficult airway    General Information and Staff    Patient location during procedure: OR  Anesthesiologist: Brandy Morris MD  Resident/CRNA: Wade Haider CRNA  Performed: CRNA     In

## 2020-02-08 NOTE — OPERATIVE REPORT
[facility]    PATIENT'S NAME: Violet Garcia   ATTENDING PHYSICIAN: Brett Kc D.O.   OPERATING PHYSICIAN: Brett Kc D.O.   PATIENT ACCOUNT#:   [de-identified]    LOCATION:  PACU [facility] PACU 2 EDWP 10  MEDICAL RECORD #:   [id number]       DATE OF BI Skin edges approximated using 4-0 Monocryl in a running subdermal fashion. Then, 20 mL of 0.5% Marcaine plain injected into the incision at the completion of the procedure. Mastisol and Steri-Strips applied.   The patient was transported from the [facility]

## 2020-02-11 ENCOUNTER — OFFICE VISIT (OUTPATIENT)
Dept: SURGERY | Facility: CLINIC | Age: 60
End: 2020-02-11

## 2020-02-11 VITALS — HEIGHT: 71 IN | WEIGHT: 212 LBS | BODY MASS INDEX: 29.68 KG/M2 | TEMPERATURE: 98 F | HEART RATE: 78 BPM

## 2020-02-11 DIAGNOSIS — K38.8 MASS OF APPENDIX: Primary | ICD-10-CM

## 2020-02-11 PROCEDURE — 99024 POSTOP FOLLOW-UP VISIT: CPT | Performed by: SURGERY

## 2020-02-12 NOTE — PROGRESS NOTES
Patient is status post laparoscopic appendectomy. Pathology is not back yet. He denies complaints no fevers no chills no complaints of abdominal pain. He is tolerating a regular diet.   His incisions are clean and dry  Physical examination abdomen is sof

## 2020-02-17 ENCOUNTER — MED REC SCAN ONLY (OUTPATIENT)
Dept: SURGERY | Facility: CLINIC | Age: 60
End: 2020-02-17

## 2020-02-17 ENCOUNTER — PATIENT OUTREACH (OUTPATIENT)
Dept: SURGERY | Facility: CLINIC | Age: 60
End: 2020-02-17

## 2020-03-11 ENCOUNTER — WORKER'S COMP (OUTPATIENT)
Dept: OCCUPATIONAL MEDICINE | Age: 60
End: 2020-03-11

## 2020-03-11 DIAGNOSIS — Z02.71 ISSUE OF MEDICAL CERTIFICATE FOR DISABILITY EXAMINATION: ICD-10-CM

## 2020-03-11 DIAGNOSIS — H57.89 IRRITATION OF RIGHT EYE: ICD-10-CM

## 2020-03-11 DIAGNOSIS — H10.501 BLEPHAROCONJUNCTIVITIS OF RIGHT EYE, UNSPECIFIED BLEPHAROCONJUNCTIVITIS TYPE: Primary | ICD-10-CM

## 2020-03-11 PROCEDURE — 99203 OFFICE O/P NEW LOW 30 MIN: CPT | Performed by: PHYSICIAN ASSISTANT

## 2020-05-15 RX ORDER — INDOMETHACIN 50 MG/1
CAPSULE ORAL
Qty: 10 CAPSULE | Refills: 1 | OUTPATIENT
Start: 2020-05-15

## 2020-05-15 NOTE — TELEPHONE ENCOUNTER
Patient advised. Verbalized understanding. Perez Stubbs verbally consents to a Virtual/Telephone Check-In service on 5/19/20.   Patient understands and accepts financial responsibility for any deductible, co-insurance and/or co-pays associated with

## 2020-05-15 NOTE — TELEPHONE ENCOUNTER
INDOMETHACIN 50 MG Oral Cap    Last refilled: 1/10/20 - 10 caps - 1 refill    Last OV: 10/17/19 - elevated liver enzymes -       No future appts. Please advise.

## 2020-05-19 ENCOUNTER — VIRTUAL PHONE E/M (OUTPATIENT)
Dept: FAMILY MEDICINE CLINIC | Facility: CLINIC | Age: 60
End: 2020-05-19
Payer: COMMERCIAL

## 2020-05-19 DIAGNOSIS — R74.8 ELEVATED LIVER ENZYMES: ICD-10-CM

## 2020-05-19 DIAGNOSIS — I10 ESSENTIAL HYPERTENSION: Primary | ICD-10-CM

## 2020-05-19 DIAGNOSIS — M1A.9XX0 CHRONIC GOUT WITHOUT TOPHUS, UNSPECIFIED CAUSE, UNSPECIFIED SITE: ICD-10-CM

## 2020-05-19 DIAGNOSIS — G47.00 INSOMNIA, UNSPECIFIED TYPE: ICD-10-CM

## 2020-05-19 PROCEDURE — 99214 OFFICE O/P EST MOD 30 MIN: CPT | Performed by: FAMILY MEDICINE

## 2020-05-19 RX ORDER — ALLOPURINOL 100 MG/1
100 TABLET ORAL DAILY
Qty: 90 TABLET | Refills: 1 | Status: SHIPPED | OUTPATIENT
Start: 2020-05-19 | End: 2021-06-29

## 2020-05-19 RX ORDER — TRAZODONE HYDROCHLORIDE 50 MG/1
TABLET ORAL
Qty: 90 TABLET | Refills: 1 | Status: SHIPPED | OUTPATIENT
Start: 2020-05-19 | End: 2020-08-10

## 2020-05-19 RX ORDER — INDOMETHACIN 50 MG/1
CAPSULE ORAL
Qty: 20 CAPSULE | Refills: 1 | Status: SHIPPED | OUTPATIENT
Start: 2020-05-19 | End: 2020-06-13

## 2020-05-19 RX ORDER — LISINOPRIL 20 MG/1
20 TABLET ORAL DAILY
Qty: 90 TABLET | Refills: 1 | Status: SHIPPED | OUTPATIENT
Start: 2020-05-19 | End: 2021-06-29

## 2020-05-19 NOTE — PROGRESS NOTES
Virtual Telephone Check-In    Carlos People verbally consents to a Virtual/Telephone Check-In visit on 05/19/20. Patient understands and accepts financial responsibility for any deductible, co-insurance and/or co-pays associated with this service. Binge frequency: Never    Drug use: No      EXAM:   There were no vitals taken for this visit.     No distress    A/P:   Essential hypertension  (primary encounter diagnosis)  Chronic gout without tophus, unspecified cause, unspecified site  Elevated liver

## 2020-06-12 ENCOUNTER — OFFICE VISIT (OUTPATIENT)
Dept: FAMILY MEDICINE CLINIC | Facility: CLINIC | Age: 60
End: 2020-06-12
Payer: COMMERCIAL

## 2020-06-12 VITALS
WEIGHT: 210 LBS | BODY MASS INDEX: 29.4 KG/M2 | TEMPERATURE: 98 F | OXYGEN SATURATION: 99 % | HEART RATE: 78 BPM | DIASTOLIC BLOOD PRESSURE: 84 MMHG | SYSTOLIC BLOOD PRESSURE: 130 MMHG | HEIGHT: 71 IN | RESPIRATION RATE: 18 BRPM

## 2020-06-12 DIAGNOSIS — Z00.00 GENERAL MEDICAL EXAM: Primary | ICD-10-CM

## 2020-06-12 PROCEDURE — 80061 LIPID PANEL: CPT | Performed by: FAMILY MEDICINE

## 2020-06-12 PROCEDURE — 82306 VITAMIN D 25 HYDROXY: CPT | Performed by: FAMILY MEDICINE

## 2020-06-12 PROCEDURE — 84153 ASSAY OF PSA TOTAL: CPT | Performed by: FAMILY MEDICINE

## 2020-06-12 PROCEDURE — 83036 HEMOGLOBIN GLYCOSYLATED A1C: CPT | Performed by: FAMILY MEDICINE

## 2020-06-12 PROCEDURE — 99396 PREV VISIT EST AGE 40-64: CPT | Performed by: FAMILY MEDICINE

## 2020-06-12 PROCEDURE — 80050 GENERAL HEALTH PANEL: CPT | Performed by: FAMILY MEDICINE

## 2020-06-12 NOTE — PROGRESS NOTES
Andrae Dueñas is a 60year old male who presents for a complete physical exam.   HPI:   Pt generally doing well.        Immunization History  Administered            Date(s) Administered    TDAP                  04/15/2016    Wt Readings from Last 6 Enc MOUTH TWICE DAILY WITH MEALS AS NEEDED FOR GOUT FLARE UP SYMPTOMS FOR 5 DAYS 20 capsule 1   • lisinopril 20 MG Oral Tab Take 1 tablet (20 mg total) by mouth daily.  90 tablet 1   • traZODone HCl 50 MG Oral Tab TAKE 1 TABLET BY MOUTH EVERY NIGHT 90 tablet 1 rashes,no suspicious lesions on exposed areas  HEENT: atraumatic, normocephalic,ears and throat are clear  EYES:PERRLA, EOMI, conjunctiva are clear  NECK: supple,no adenopathy,no bruits  LUNGS: clear to auscultation  CARDIO: RRR without murmur  GI: good BS

## 2020-06-13 NOTE — TELEPHONE ENCOUNTER
Last refilled on 05/19/2020 for # 20 with 1 rf. Last labs 06/12/2020 (YESTERDAY)  Last seen on 06/12/20 CC:General medical exam.  BP: 130/84  No future appointments. Thank you.

## 2020-06-15 RX ORDER — INDOMETHACIN 50 MG/1
CAPSULE ORAL
Qty: 20 CAPSULE | Refills: 0 | Status: SHIPPED | OUTPATIENT
Start: 2020-06-15 | End: 2020-06-26

## 2020-06-26 RX ORDER — INDOMETHACIN 50 MG/1
CAPSULE ORAL
Qty: 20 CAPSULE | Refills: 0 | Status: SHIPPED | OUTPATIENT
Start: 2020-06-26 | End: 2021-06-29

## 2020-08-09 DIAGNOSIS — I10 ESSENTIAL HYPERTENSION: ICD-10-CM

## 2020-08-09 DIAGNOSIS — M1A.9XX0 CHRONIC GOUT WITHOUT TOPHUS, UNSPECIFIED CAUSE, UNSPECIFIED SITE: ICD-10-CM

## 2020-08-10 RX ORDER — TRAZODONE HYDROCHLORIDE 50 MG/1
TABLET ORAL
Qty: 90 TABLET | Refills: 1 | Status: SHIPPED | OUTPATIENT
Start: 2020-08-10 | End: 2021-06-29

## 2020-08-10 NOTE — TELEPHONE ENCOUNTER
LOV 6/12/20 for a general exam.    traZODone HCl 50 MG Oral Tab 90 tablet 1 5/19/2020    Sig:   TAKE 1 TABLET BY MOUTH EVERY NIGHT     Route:   (none)       No future appointments.

## 2020-09-10 ENCOUNTER — TELEPHONE (OUTPATIENT)
Dept: FAMILY MEDICINE CLINIC | Facility: CLINIC | Age: 60
End: 2020-09-10

## 2020-09-10 NOTE — TELEPHONE ENCOUNTER
Patient missed work Tuesday and Wednesday - he is not allowed to attend work if he has any symptoms that could potentially be COVID related. 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 to take son to college.   Patient woke up Tuesday and Wednesday morning with

## 2020-09-10 NOTE — TELEPHONE ENCOUNTER
If he has any sx currently (HA, fatigue. ..) rec he go to  otherwise he can schedule a virtual visit with Dr. Sadie Grace for recs.

## 2020-09-10 NOTE — TELEPHONE ENCOUNTER
Pt called stated they have to do a questionair for work for [employer] and if they have any symptoms they can not come in. He stated he missed work Tuesday due to a headache and needs a note for work. He stated he is fine now.  He stated we can fax the note to B

## 2020-09-10 NOTE — TELEPHONE ENCOUNTER
Patient not currently having any symptoms  Headache has resolved  No available apt for tomorrow or Saturday  Patient aware we will call back tomorrow with plan.   Patient also given information on physican's immediate to be tested if he would like to get th

## 2021-02-03 NOTE — TELEPHONE ENCOUNTER
Marsha Park is a 39 year old female patient.  Patient Active Problem List   Diagnosis   • Morbid obesity with body mass index (BMI) of 60.0 to 69.9 in adult (CMS/HCC)   • Low vitamin D level   • Malignant neoplasm of overlapping sites of cervix (CMS/HCC)   • Encounter for central line care   • Dehydration   • Iron deficiency anemia due to chronic blood loss   • Elevated serum creatinine   • Hydronephrosis, unspecified hydronephrosis type   • Hypomagnesemia   • Hypokalemia   • Rectal bleeding   • Radiation proctitis   • Acute blood loss anemia   • Urinary retention   • Thrombocytopenia (CMS/HCC)   • Hospital discharge follow-up   • Delayed emergence from anesthesia   • PONV (postoperative nausea and vomiting)   • Peritonitis (CMS/HCC)   • Surgical wound, non healing, subsequent encounter     Past Medical History:   Diagnosis Date   • Anemia    • Diarrhea    • Encounter for central line care 12/16/2019   • Family history of adverse response to anesthesia in mother     takes a long time for mother to wake up after surgery    • Gastroesophageal reflux disease    • History of blood transfusion    • Hydronephrosis of left kidney     secondary to lymph node pressing on ureter    • Malignant neoplasm (CMS/HCC) 11/2019    metastatic cervical cancer   • Nausea and vomiting    • Obesity    • Personal history of chemotherapy    • Personal history of radiation therapy 2020   • Pilonidal cyst 6/4/2018   • PONV (postoperative nausea and vomiting)    • Severe protein-calorie malnutrition (CMS/HCC) 11/17/2020   • Tension headache 1/14/2016   • Vitamin D deficiency    I was asked to see Marsha Park for abdominal pain   Patient is a 39 year old female who was admitted with Bowel perforation (CMS/HCC) [K63.1].     CC: abdominal pain      HPI:  Transferred 12/21 form [place] after admitted 12/16 with abdominal pain , s/p Ex  Lap with colostomy 12/16; extubated  12/18      There is concern for colostomy retraction, necrosis  indomethacin 50 MG Oral Cap     RADHA IN [place] [facility] [place] and development of further abscess formation. Patient may require further surgical intervention and high risk. She was transferred here to [facility] for ongoing monitoring and surgical consultation with Dr. Delcid     Asked to see for post op pain   continues to describe aching abdomin pain worse with activity              Minimal activity in bed     Continues to scores at 6/10 with goal of 4             Dilaudid pca no basal rate 02 mg every 10 min with 21 demands and 13 deliveries overnight.       No hx of chronic opioid use      P     Past medical history significant for metastatic cervical cancer s/p chemotherapy and radiation, severe protein calorie malnutrition, morbid obesity, anticoagulation for superficial thrombophlebitis LLE          Current Facility-Administered Medications   Medication Dose Route Frequency Provider Last Rate Last Admin   • parenteral nutrition adult custom central (minimum volume)   Intravenous Continuous PN Sandra Cardona NP       • HYDROmorphone (DILAUDID) injection 0.2 mg  0.2 mg Intravenous Q3H PRN Joshua Tate MD        Or   • HYDROmorphone (DILAUDID) injection 0.5 mg  0.5 mg Intravenous Q3H PRN Joshua Tate MD   0.5 mg at 02/03/21 1608   • parenteral nutrition adult custom central (minimum volume)   Intravenous Continuous PN Sandra Cardona NP 54 mL/hr at 02/02/21 2209 New Bag at 02/02/21 2209   • metoCLOPramide (REGLAN) injection 10 mg  10 mg Intravenous 4x Daily AC & HS Susana Devi NP   10 mg at 02/03/21 1001   • polyethylene glycol (MIRALAX) packet 17 g  17 g Oral Daily Susana Devi NP   17 g at 02/03/21 0930   • docusate sodium (COLACE) capsule 100 mg  100 mg Oral Daily Susana Devi NP   100 mg at 02/03/21 0958   • furosemide (LASIX INJECT) injection 120 mg  120 mg Intravenous BID Marci Sanchez MD   120 mg at 02/03/21 0946   • oxyCODONE SR (OxyCONTIN) 12 hr tablet 10 mg  10 mg Oral Nightly Joshua Tate MD   10 mg at 02/02/21 2012   • oxyCODONE SR  (OxyCONTIN) 12 hr tablet 10 mg  10 mg Oral Daily PRN Joshua Tate MD       • silver nitrate topical stick 1 applicator  1 applicator Topical PRN DAVIDSON Hodges   1 applicator at 01/29/21 1141   • melatonin tablet 9 mg  9 mg Oral Nightly PRN Sandra Cardona NP       • LORazepam (ATIVAN) tablet 0.5 mg  0.5 mg Oral Q8H PRN Allen Silver MD       • cyanocobalamin (Vitamin B-12) tablet 1,000 mcg  1,000 mcg Oral Daily Roxie Pfouts MD Subhash   1,000 mcg at 02/03/21 0958   • ondansetron (ZOFRAN) injection 8 mg  8 mg Intravenous Q6H PRN Rupert Guerrero MD   8 mg at 02/03/21 1608   • prochlorperazine (COMPAZINE) injection 10 mg  10 mg Intravenous Q6H PRN Rupert Guerrero MD   10 mg at 01/29/21 0908   • sodium chloride 0.9 % flush bag 25 mL  25 mL Intravenous PRN Dawn Cardozo CNP       • heparin (porcine) 10 UNIT/ML lock flush 50 Units  5 mL Intracatheter Daily Rupert Guerrero MD   50 Units at 02/01/21 0830   • heparin (porcine) 10 UNIT/ML lock flush 50 Units  5 mL Intracatheter PRN Rupert Guerrero MD       • heparin 100 UNIT/ML lock flush 500 Units  500 Units Intravenous PRN Rupert Guerrero MD       • heparin 100 UNIT/ML lock flush 500 Units  500 Units Intracatheter Q30 Days Rupert Guerrero MD       • sodium chloride (PF) 0.9 % injection 20 mL  20 mL Injection PRN Rupert Guerrero MD       • famotidine (PEPCID) injection 20 mg  20 mg Intravenous 2 times per day Rupert Guerrero MD   20 mg at 02/03/21 0958   • sodium hypochlorite (DAKIN'S) 0.062 % (1/8 strength) irrigation solution 1 application  1 application Topical Daily Rupert Guerrero MD   1 application at 02/03/21 1349   • belladonna-opium (B&O SUPPRETTE) 16.2-30 MG suppository 1 suppository  1 suppository Rectal BID PRN Donnell Chantell, APNP       • hyoscyamine (LEVSIN SL) sublingual tablet 0.125 mg  0.125 mg Sublingual Q4H PRN Jax Garay MD   0.125 mg at 01/04/21 2202   • diphenhydrAMINE (BENADRYL) capsule 25 mg  25 mg Oral Q6H PRN Rupert Guerrero MD   25 mg at 12/31/20 2134   • fat  emulsion 20 % injection 250 mL  250 mL Intravenous Once per day on Mon Wed Fri Sandra Cardona NP 20.8 mL/hr at 02/01/21 2322 250 mL at 02/01/21 2322   • sodium chloride (PF) 0.9 % injection 10 mL  10 mL Injection 3 times per day Rupert Guerrero MD   10 mL at 02/03/21 1350   • sodium chloride (PF) 0.9 % injection 10 mL  10 mL Injection PRN Rupert Guerrero MD       • sodium chloride (PF) 0.9 % injection 10 mL  10 mL Injection PRN Rupert Guerrero MD       • alteplase (CATHFLO ACTIVASE) injection 2 mg  2 mg Intracatheter PRN Rupert Guerrero MD       • sodium hypochlorite (DAKIN'S) 0.062 % (1/8 strength) irrigation solution 1 application  1 application Topical PRN DAVIDSON Hodges   1 application at 01/21/21 1701   • acetaminophen (TYLENOL) tablet 650 mg  650 mg Oral Q4H PRN Jax Garay MD   650 mg at 01/30/21 1707   • nystatin (MYCOSTATIN) powder   Topical 2 times per day Jax Garay MD   Stopped at 12/26/20 2136   • Potassium Standard Replacement Protocol   Does not apply See Admin Instructions Jax Garay MD       • Magnesium Standard Replacement Protocol   Does not apply See Admin Instructions Jax Garay MD       • dextrose 10 % infusion  1,000 mL Intravenous Continuous PRN Sandra Cardona NP       • TPN - DIETICIAN/PHARMACIST MANAGED   Does not apply See Admin Instructions Sandra Cardona NP         ALLERGIES:   Allergen Reactions   • Butorphanol Other (See Comments)     hypotension   • Demerol CARDIAC DISTURBANCES     States her heart stops.   • Meperidine CARDIAC DISTURBANCES     Active Problems:    Peritonitis (CMS/HCC)    Surgical wound, non healing, subsequent encounter    Blood pressure 121/67, pulse (!) 101, temperature 97.2 °F (36.2 °C), temperature source Oral, resp. rate 16, height 5' 6\" (1.676 m), weight (!) 186 kg, SpO2 97 %.    Subjective:  Symptoms:  Improved.  She reports malaise, weakness and anorexia.  No shortness of breath, cough, chest pain, headache, chest pressure, diarrhea or  anxiety.    Diet:  Poor intake.  No nausea or vomiting.    Activity level: Impaired due to pain (and weakness ).    Pain:  She complains of pain that is moderate.  She reports pain is improving.  Pain is requiring pain medication and partially controlled.      Objective:  General Appearance:  Comfortable, in no acute distress, not in pain and ill-appearing (no pain behavior at rest but activity limited ).    Vital signs: (most recent): Blood pressure 121/67, pulse (!) 101, temperature 97.2 °F (36.2 °C), temperature source Oral, resp. rate 16, height 5' 6\" (1.676 m), weight (!) 186 kg, SpO2 97 %.  Vital signs are normal.    Output: Producing urine.    Lungs:  Normal effort and normal respiratory rate.  She is not in respiratory distress.    Heart: Normal rate.  Regular rhythm.    Chest: Symmetric chest wall expansion. No chest wall tenderness.    Abdomen: Abdomen is soft.  There is generalized tenderness.     Extremities: Decreased range of motion.  (Left leg pain )  Neurological: Patient is alert and oriented to person, place and time.  (Generalized weakness ).    Skin:  Warm and dry.      Assessment:    Condition: In serious condition.  Improving.   (Cr 12/31    IMPRESSION:     1.  Persistent pelvic fluid collection, similar in comparison to prior  imaging. Unchanged right anterior lower abdominal wall percutaneous  catheter with tip terminating in the left lower abdominal quadrant.  2.  Slight interval increase in mildly distended loops of small bowel with  air-fluid levels. No focal bowel narrowing to suggest a transition point,  probable ileus.  3.  4.  Persisting/unchanged clustered mesorectal fat gas locules.  4.  Unremarkable postoperative changes of partial rectosigmoid resection,  with transverse loop colostomy otherwise.).     12/16  Exploratory laparotomy with sigmoid colon and partial rectal resection, transverse loop colostomy, omentectomy, extensive lysis of adhesion and abdominal wash out  12/16/20    Transferred to [facility] for ongoing care  High risk for complications     Pain managed with increase in dilaudid dosing      Denies oversedation during day   Still scores pain at 6-7/10 with goal of 2 which is not changed   POSS scale of 1     Continues to complain of aching abdominal pain which is not changed   Leg pain improved     OxyContin 10 mg nightly  and not using during the day   Dilaudid 0.5 mg times 7 yesterday and times 5 today    mme yesterday 85 and 60 the prior day     Sleeping improved at night     Activity remains limited      A   Post op ex-lap for sbo              Persistent pain   Metastatic cervical cancer   arf  Necrosis of rectum and sigmoid colon   DVT      p   D/w pt     No change    Assess in am   Increasing increases risk of side effects and may not add to duration of action     Will need to stop iv opioids at some point but they are providing adequate pain control at this time     Joshua Tate MD

## 2021-02-09 ENCOUNTER — IMAGING SERVICES (OUTPATIENT)
Dept: GENERAL RADIOLOGY | Age: 61
End: 2021-02-09
Attending: PHYSICIAN ASSISTANT

## 2021-02-09 DIAGNOSIS — S40.022A CONTUSION OF LEFT UPPER EXTREMITY, INITIAL ENCOUNTER: Primary | ICD-10-CM

## 2021-02-09 DIAGNOSIS — S40.022A CONTUSION OF LEFT UPPER EXTREMITY, INITIAL ENCOUNTER: ICD-10-CM

## 2021-02-09 PROCEDURE — 73060 X-RAY EXAM OF HUMERUS: CPT | Performed by: RADIOLOGY

## 2021-02-16 ENCOUNTER — IMAGING SERVICES (OUTPATIENT)
Dept: GENERAL RADIOLOGY | Age: 61
End: 2021-02-16
Attending: PHYSICIAN ASSISTANT

## 2021-02-16 DIAGNOSIS — S40.022D CONTUSION OF LEFT UPPER EXTREMITY, SUBSEQUENT ENCOUNTER: ICD-10-CM

## 2021-02-16 DIAGNOSIS — S40.022D CONTUSION OF LEFT UPPER EXTREMITY, SUBSEQUENT ENCOUNTER: Primary | ICD-10-CM

## 2021-02-16 PROCEDURE — 73060 X-RAY EXAM OF HUMERUS: CPT | Performed by: RADIOLOGY

## 2021-02-23 DIAGNOSIS — S40.022D CONTUSION OF LEFT UPPER EXTREMITY, SUBSEQUENT ENCOUNTER: Primary | ICD-10-CM

## 2021-03-01 ENCOUNTER — TELEPHONE (OUTPATIENT)
Dept: PHYSICAL THERAPY | Age: 61
End: 2021-03-01

## 2021-03-03 ENCOUNTER — WORKER'S COMP (OUTPATIENT)
Dept: PHYSICAL THERAPY | Age: 61
End: 2021-03-03

## 2021-03-03 DIAGNOSIS — M25.612 DECREASED RANGE OF MOTION OF LEFT SHOULDER: ICD-10-CM

## 2021-03-03 DIAGNOSIS — M25.512 ACUTE PAIN OF LEFT SHOULDER: Primary | ICD-10-CM

## 2021-03-03 PROCEDURE — 97161 PT EVAL LOW COMPLEX 20 MIN: CPT | Performed by: PHYSICAL THERAPIST

## 2021-03-03 PROCEDURE — 97140 MANUAL THERAPY 1/> REGIONS: CPT | Performed by: PHYSICAL THERAPIST

## 2021-03-03 PROCEDURE — 97110 THERAPEUTIC EXERCISES: CPT | Performed by: PHYSICAL THERAPIST

## 2021-03-03 ASSESSMENT — ENCOUNTER SYMPTOMS
SUBJECTIVE PAIN PROGRESSION: IMPROVED
QUALITY: STIFF
PAIN SCALE AT LOWEST: 0
ALLEVIATING FACTORS: CHANGE IN POSITION
PAIN SEVERITY NOW: 0
PAIN SCALE AT HIGHEST: 2
QUALITY: ACHE

## 2021-03-08 ENCOUNTER — WORKER'S COMP (OUTPATIENT)
Dept: PHYSICAL THERAPY | Age: 61
End: 2021-03-08

## 2021-03-08 DIAGNOSIS — M25.512 ACUTE PAIN OF LEFT SHOULDER: Primary | ICD-10-CM

## 2021-03-08 DIAGNOSIS — M25.612 DECREASED RANGE OF MOTION OF LEFT SHOULDER: ICD-10-CM

## 2021-03-08 PROCEDURE — 97110 THERAPEUTIC EXERCISES: CPT | Performed by: PHYSICAL THERAPIST

## 2021-03-08 PROCEDURE — 97112 NEUROMUSCULAR REEDUCATION: CPT | Performed by: PHYSICAL THERAPIST

## 2021-03-08 ASSESSMENT — ENCOUNTER SYMPTOMS
SUBJECTIVE PAIN PROGRESSION: RESOLVED
PAIN SEVERITY NOW: 0
PAIN SCALE AT LOWEST: 0
PAIN SCALE AT HIGHEST: 0

## 2021-06-29 ENCOUNTER — OFFICE VISIT (OUTPATIENT)
Dept: FAMILY MEDICINE CLINIC | Facility: CLINIC | Age: 61
End: 2021-06-29
Payer: COMMERCIAL

## 2021-06-29 VITALS
HEIGHT: 69.5 IN | SYSTOLIC BLOOD PRESSURE: 138 MMHG | RESPIRATION RATE: 16 BRPM | HEART RATE: 77 BPM | DIASTOLIC BLOOD PRESSURE: 88 MMHG | OXYGEN SATURATION: 99 % | BODY MASS INDEX: 30.69 KG/M2 | TEMPERATURE: 98 F | WEIGHT: 212 LBS

## 2021-06-29 DIAGNOSIS — G47.00 INSOMNIA, UNSPECIFIED TYPE: ICD-10-CM

## 2021-06-29 DIAGNOSIS — I10 ESSENTIAL HYPERTENSION: ICD-10-CM

## 2021-06-29 DIAGNOSIS — Z00.00 ANNUAL PHYSICAL EXAM: Primary | ICD-10-CM

## 2021-06-29 DIAGNOSIS — M1A.9XX0 CHRONIC GOUT WITHOUT TOPHUS, UNSPECIFIED CAUSE, UNSPECIFIED SITE: ICD-10-CM

## 2021-06-29 PROCEDURE — 3008F BODY MASS INDEX DOCD: CPT | Performed by: FAMILY MEDICINE

## 2021-06-29 PROCEDURE — 99396 PREV VISIT EST AGE 40-64: CPT | Performed by: FAMILY MEDICINE

## 2021-06-29 PROCEDURE — 3079F DIAST BP 80-89 MM HG: CPT | Performed by: FAMILY MEDICINE

## 2021-06-29 PROCEDURE — 3075F SYST BP GE 130 - 139MM HG: CPT | Performed by: FAMILY MEDICINE

## 2021-06-29 RX ORDER — ALLOPURINOL 100 MG/1
100 TABLET ORAL DAILY
Qty: 90 TABLET | Refills: 1 | Status: SHIPPED | OUTPATIENT
Start: 2021-06-29 | End: 2021-12-27

## 2021-06-29 RX ORDER — TRAZODONE HYDROCHLORIDE 50 MG/1
50 TABLET ORAL NIGHTLY
Qty: 90 TABLET | Refills: 0 | Status: SHIPPED | OUTPATIENT
Start: 2021-06-29 | End: 2021-10-01

## 2021-06-29 RX ORDER — INDOMETHACIN 50 MG/1
CAPSULE ORAL
Qty: 20 CAPSULE | Refills: 0 | Status: SHIPPED | OUTPATIENT
Start: 2021-06-29

## 2021-06-29 NOTE — PROGRESS NOTES
Lisette Chung is a 57year old male who presents for a complete physical exam.   HPI:   No concerns today. Colonoscopy: UTD. Due 12/2022    F/u gout  Flares up few times a year  Great toe affected    Has insomnia  3-4 years.   On trazodone as needed Sleep apnea    • Unspecified essential hypertension    • Visual impairment     glasses      Past Surgical History:   Procedure Laterality Date   • APPENDECTOMY     • COLONOSCOPY        Family History   Problem Relation Age of Onset   • Diabetes Father    • intact  PSYCH: mood nl    ASSESSMENT AND PLAN:   Lasha Valadez is a 57year old male who presents for a complete physical exam. Pt's weight is Body mass index is 30.86 kg/m². , recommended low fat/carb diet and aerobic exercise 30-45 minutes 5 times wee

## 2021-07-19 ENCOUNTER — NURSE ONLY (OUTPATIENT)
Dept: FAMILY MEDICINE CLINIC | Facility: CLINIC | Age: 61
End: 2021-07-19
Payer: COMMERCIAL

## 2021-07-19 DIAGNOSIS — Z00.00 ANNUAL PHYSICAL EXAM: ICD-10-CM

## 2021-07-19 DIAGNOSIS — M1A.9XX0 CHRONIC GOUT WITHOUT TOPHUS, UNSPECIFIED CAUSE, UNSPECIFIED SITE: ICD-10-CM

## 2021-07-19 LAB
ALBUMIN SERPL-MCNC: 3.5 G/DL (ref 3.4–5)
ALBUMIN/GLOB SERPL: 0.8 {RATIO} (ref 1–2)
ALP LIVER SERPL-CCNC: 104 U/L
ALT SERPL-CCNC: 54 U/L
ANION GAP SERPL CALC-SCNC: 4 MMOL/L (ref 0–18)
AST SERPL-CCNC: 39 U/L (ref 15–37)
BASOPHILS # BLD AUTO: 0.09 X10(3) UL (ref 0–0.2)
BASOPHILS NFR BLD AUTO: 1.2 %
BILIRUB SERPL-MCNC: 0.7 MG/DL (ref 0.1–2)
BUN BLD-MCNC: 19 MG/DL (ref 7–18)
BUN/CREAT SERPL: 18.1 (ref 10–20)
CALCIUM BLD-MCNC: 8.8 MG/DL (ref 8.5–10.1)
CHLORIDE SERPL-SCNC: 110 MMOL/L (ref 98–112)
CHOLEST SMN-MCNC: 158 MG/DL (ref ?–200)
CO2 SERPL-SCNC: 25 MMOL/L (ref 21–32)
COMPLEXED PSA SERPL-MCNC: 1.54 NG/ML (ref ?–4)
CREAT BLD-MCNC: 1.05 MG/DL
DEPRECATED RDW RBC AUTO: 43.9 FL (ref 35.1–46.3)
EOSINOPHIL # BLD AUTO: 0.28 X10(3) UL (ref 0–0.7)
EOSINOPHIL NFR BLD AUTO: 3.8 %
ERYTHROCYTE [DISTWIDTH] IN BLOOD BY AUTOMATED COUNT: 12.8 % (ref 11–15)
EST. AVERAGE GLUCOSE BLD GHB EST-MCNC: 105 MG/DL (ref 68–126)
GLOBULIN PLAS-MCNC: 4.4 G/DL (ref 2.8–4.4)
GLUCOSE BLD-MCNC: 108 MG/DL (ref 70–99)
HBA1C MFR BLD HPLC: 5.3 % (ref ?–5.7)
HCT VFR BLD AUTO: 49.2 %
HDLC SERPL-MCNC: 83 MG/DL (ref 40–59)
HGB BLD-MCNC: 17 G/DL
IMM GRANULOCYTES # BLD AUTO: 0.02 X10(3) UL (ref 0–1)
IMM GRANULOCYTES NFR BLD: 0.3 %
LDLC SERPL CALC-MCNC: 62 MG/DL (ref ?–100)
LYMPHOCYTES # BLD AUTO: 1.99 X10(3) UL (ref 1–4)
LYMPHOCYTES NFR BLD AUTO: 26.7 %
M PROTEIN MFR SERPL ELPH: 7.9 G/DL (ref 6.4–8.2)
MCH RBC QN AUTO: 32.4 PG (ref 26–34)
MCHC RBC AUTO-ENTMCNC: 34.6 G/DL (ref 31–37)
MCV RBC AUTO: 93.7 FL
MONOCYTES # BLD AUTO: 0.65 X10(3) UL (ref 0.1–1)
MONOCYTES NFR BLD AUTO: 8.7 %
NEUTROPHILS # BLD AUTO: 4.41 X10 (3) UL (ref 1.5–7.7)
NEUTROPHILS # BLD AUTO: 4.41 X10(3) UL (ref 1.5–7.7)
NEUTROPHILS NFR BLD AUTO: 59.3 %
NONHDLC SERPL-MCNC: 75 MG/DL (ref ?–130)
OSMOLALITY SERPL CALC.SUM OF ELEC: 291 MOSM/KG (ref 275–295)
PATIENT FASTING Y/N/NP: YES
PATIENT FASTING Y/N/NP: YES
PLATELET # BLD AUTO: 192 10(3)UL (ref 150–450)
POTASSIUM SERPL-SCNC: 4.2 MMOL/L (ref 3.5–5.1)
RBC # BLD AUTO: 5.25 X10(6)UL
SODIUM SERPL-SCNC: 139 MMOL/L (ref 136–145)
TRIGL SERPL-MCNC: 65 MG/DL (ref 30–149)
TSI SER-ACNC: 2.18 MIU/ML (ref 0.36–3.74)
URATE SERPL-MCNC: 7.8 MG/DL
VLDLC SERPL CALC-MCNC: 10 MG/DL (ref 0–30)
WBC # BLD AUTO: 7.4 X10(3) UL (ref 4–11)

## 2021-07-19 PROCEDURE — 80050 GENERAL HEALTH PANEL: CPT | Performed by: FAMILY MEDICINE

## 2021-07-19 PROCEDURE — 84153 ASSAY OF PSA TOTAL: CPT | Performed by: FAMILY MEDICINE

## 2021-07-19 PROCEDURE — 83036 HEMOGLOBIN GLYCOSYLATED A1C: CPT | Performed by: FAMILY MEDICINE

## 2021-07-19 PROCEDURE — 84550 ASSAY OF BLOOD/URIC ACID: CPT | Performed by: FAMILY MEDICINE

## 2021-07-19 PROCEDURE — 80061 LIPID PANEL: CPT | Performed by: FAMILY MEDICINE

## 2021-07-19 NOTE — PROGRESS NOTES
Haydee Sher presents today for nurse visit. Labs ordered by Dr Lillie Mar. 1 light green, 1 lavender drawn from right ac area with 1 attempt with straight needle. Patient tolerated well. Left office in stable condition.

## 2021-07-22 ENCOUNTER — TELEPHONE (OUTPATIENT)
Dept: FAMILY MEDICINE CLINIC | Facility: CLINIC | Age: 61
End: 2021-07-22

## 2021-07-22 DIAGNOSIS — M1A.9XX0 CHRONIC GOUT WITHOUT TOPHUS, UNSPECIFIED CAUSE, UNSPECIFIED SITE: Primary | ICD-10-CM

## 2021-09-21 ENCOUNTER — TELEPHONE (OUTPATIENT)
Dept: FAMILY MEDICINE CLINIC | Facility: CLINIC | Age: 61
End: 2021-09-21

## 2021-09-21 NOTE — TELEPHONE ENCOUNTER
----- Message from Darren Espitia RN sent at 7/22/2021  9:31 AM CDT -----  Regarding: uric acid 2 mo

## 2021-09-22 NOTE — TELEPHONE ENCOUNTER
Patient advised. Verbalized understanding.    Future Appointments   Date Time Provider Sherly Encarnacion   9/27/2021  9:00 AM [facility] NURSE [facility]

## 2021-10-01 DIAGNOSIS — M1A.9XX0 CHRONIC GOUT WITHOUT TOPHUS, UNSPECIFIED CAUSE, UNSPECIFIED SITE: ICD-10-CM

## 2021-10-01 DIAGNOSIS — I10 ESSENTIAL HYPERTENSION: ICD-10-CM

## 2021-10-01 RX ORDER — TRAZODONE HYDROCHLORIDE 50 MG/1
TABLET ORAL
Qty: 90 TABLET | Refills: 0 | Status: SHIPPED | OUTPATIENT
Start: 2021-10-01 | End: 2021-12-30

## 2021-10-01 NOTE — TELEPHONE ENCOUNTER
LOV 6/29/21 for an annual exam.    traZODone HCl 50 MG Oral Tab 90 tablet 0 6/29/2021    Sig:   Take 1 tablet (50 mg total) by mouth nightly. Route:   Oral       No future appointments.

## 2021-12-27 DIAGNOSIS — I10 ESSENTIAL HYPERTENSION: ICD-10-CM

## 2021-12-27 DIAGNOSIS — M1A.9XX0 CHRONIC GOUT WITHOUT TOPHUS, UNSPECIFIED CAUSE, UNSPECIFIED SITE: ICD-10-CM

## 2021-12-27 RX ORDER — ALLOPURINOL 100 MG/1
TABLET ORAL
Qty: 90 TABLET | Refills: 1 | Status: SHIPPED | OUTPATIENT
Start: 2021-12-27

## 2021-12-27 NOTE — TELEPHONE ENCOUNTER
LOV 6/29/21 for a physical.    allopurinol 100 MG Oral Tab 90 tablet 1 6/29/2021    Sig:   Take 1 tablet (100 mg total) by mouth daily. Route:   Oral       No future appointments.

## 2021-12-28 NOTE — TELEPHONE ENCOUNTER
Last refilled on 10/01/21 for # 90  with 0  rf. Last labs : 07/19/2021  Last seen on 06/29/21 ( [facility])  . /88  No future appointments. RX PENDED. PLEASE ADVISE. Thank you.

## 2021-12-30 RX ORDER — TRAZODONE HYDROCHLORIDE 50 MG/1
TABLET ORAL
Qty: 90 TABLET | Refills: 0 | Status: SHIPPED | OUTPATIENT
Start: 2021-12-30

## 2023-02-07 NOTE — TELEPHONE ENCOUNTER
Review of Systems          Physical Exam      Maternal grandmother with Factor V Leiden.     Patient advised and verbalized understanding.   Patient is going to try and get tested - will bring his results to his employer

## 2023-07-17 ENCOUNTER — OFFICE VISIT (OUTPATIENT)
Dept: FAMILY MEDICINE CLINIC | Facility: CLINIC | Age: 63
End: 2023-07-17
Payer: COMMERCIAL

## 2023-07-17 VITALS
HEART RATE: 74 BPM | WEIGHT: 211 LBS | OXYGEN SATURATION: 99 % | HEIGHT: 69.5 IN | RESPIRATION RATE: 16 BRPM | BODY MASS INDEX: 30.55 KG/M2 | TEMPERATURE: 97 F | DIASTOLIC BLOOD PRESSURE: 98 MMHG | SYSTOLIC BLOOD PRESSURE: 178 MMHG

## 2023-07-17 DIAGNOSIS — Z00.00 ANNUAL PHYSICAL EXAM: Primary | ICD-10-CM

## 2023-07-17 DIAGNOSIS — I10 ESSENTIAL HYPERTENSION: ICD-10-CM

## 2023-07-17 DIAGNOSIS — M1A.9XX0 CHRONIC GOUT WITHOUT TOPHUS, UNSPECIFIED CAUSE, UNSPECIFIED SITE: ICD-10-CM

## 2023-07-17 LAB
ALBUMIN SERPL-MCNC: 4.1 G/DL (ref 3.4–5)
ALBUMIN/GLOB SERPL: 1 {RATIO} (ref 1–2)
ALP LIVER SERPL-CCNC: 73 U/L
ALT SERPL-CCNC: 45 U/L
ANION GAP SERPL CALC-SCNC: 8 MMOL/L (ref 0–18)
AST SERPL-CCNC: 39 U/L (ref 15–37)
BASOPHILS # BLD AUTO: 0.07 X10(3) UL (ref 0–0.2)
BASOPHILS NFR BLD AUTO: 1.1 %
BILIRUB SERPL-MCNC: 0.8 MG/DL (ref 0.1–2)
BUN BLD-MCNC: 18 MG/DL (ref 7–18)
CALCIUM BLD-MCNC: 9.3 MG/DL (ref 8.5–10.1)
CHLORIDE SERPL-SCNC: 105 MMOL/L (ref 98–112)
CHOLEST SERPL-MCNC: 172 MG/DL (ref ?–200)
CO2 SERPL-SCNC: 25 MMOL/L (ref 21–32)
COMPLEXED PSA SERPL-MCNC: 1.04 NG/ML (ref ?–4)
CREAT BLD-MCNC: 1.11 MG/DL
EOSINOPHIL # BLD AUTO: 0.19 X10(3) UL (ref 0–0.7)
EOSINOPHIL NFR BLD AUTO: 2.9 %
ERYTHROCYTE [DISTWIDTH] IN BLOOD BY AUTOMATED COUNT: 12.6 %
FASTING PATIENT LIPID ANSWER: YES
FASTING STATUS PATIENT QL REPORTED: YES
GFR SERPLBLD BASED ON 1.73 SQ M-ARVRAT: 74 ML/MIN/1.73M2 (ref 60–?)
GLOBULIN PLAS-MCNC: 4 G/DL (ref 2.8–4.4)
GLUCOSE BLD-MCNC: 100 MG/DL (ref 70–99)
HCT VFR BLD AUTO: 45.4 %
HDLC SERPL-MCNC: 83 MG/DL (ref 40–59)
HGB BLD-MCNC: 16 G/DL
IMM GRANULOCYTES # BLD AUTO: 0.02 X10(3) UL (ref 0–1)
IMM GRANULOCYTES NFR BLD: 0.3 %
LDLC SERPL CALC-MCNC: 73 MG/DL (ref ?–100)
LYMPHOCYTES # BLD AUTO: 1.65 X10(3) UL (ref 1–4)
LYMPHOCYTES NFR BLD AUTO: 25 %
MCH RBC QN AUTO: 31.8 PG (ref 26–34)
MCHC RBC AUTO-ENTMCNC: 35.2 G/DL (ref 31–37)
MCV RBC AUTO: 90.3 FL
MONOCYTES # BLD AUTO: 0.65 X10(3) UL (ref 0.1–1)
MONOCYTES NFR BLD AUTO: 9.8 %
NEUTROPHILS # BLD AUTO: 4.02 X10 (3) UL (ref 1.5–7.7)
NEUTROPHILS # BLD AUTO: 4.02 X10(3) UL (ref 1.5–7.7)
NEUTROPHILS NFR BLD AUTO: 60.9 %
NONHDLC SERPL-MCNC: 89 MG/DL (ref ?–130)
OSMOLALITY SERPL CALC.SUM OF ELEC: 288 MOSM/KG (ref 275–295)
PLATELET # BLD AUTO: 177 10(3)UL (ref 150–450)
POTASSIUM SERPL-SCNC: 4.1 MMOL/L (ref 3.5–5.1)
PROT SERPL-MCNC: 8.1 G/DL (ref 6.4–8.2)
RBC # BLD AUTO: 5.03 X10(6)UL
SODIUM SERPL-SCNC: 138 MMOL/L (ref 136–145)
TRIGL SERPL-MCNC: 87 MG/DL (ref 30–149)
TSI SER-ACNC: 2.77 MIU/ML (ref 0.36–3.74)
VLDLC SERPL CALC-MCNC: 13 MG/DL (ref 0–30)
WBC # BLD AUTO: 6.6 X10(3) UL (ref 4–11)

## 2023-07-17 PROCEDURE — 84153 ASSAY OF PSA TOTAL: CPT | Performed by: FAMILY MEDICINE

## 2023-07-17 PROCEDURE — 83036 HEMOGLOBIN GLYCOSYLATED A1C: CPT | Performed by: FAMILY MEDICINE

## 2023-07-17 PROCEDURE — 80050 GENERAL HEALTH PANEL: CPT | Performed by: FAMILY MEDICINE

## 2023-07-17 PROCEDURE — 80061 LIPID PANEL: CPT | Performed by: FAMILY MEDICINE

## 2023-07-17 RX ORDER — LISINOPRIL 20 MG/1
20 TABLET ORAL DAILY
Qty: 90 TABLET | Refills: 0 | Status: SHIPPED | OUTPATIENT
Start: 2023-07-17

## 2023-07-17 RX ORDER — AMLODIPINE BESYLATE 5 MG/1
5 TABLET ORAL DAILY
Qty: 90 TABLET | Refills: 0 | Status: SHIPPED | OUTPATIENT
Start: 2023-07-17

## 2023-07-18 LAB
EST. AVERAGE GLUCOSE BLD GHB EST-MCNC: 111 MG/DL (ref 68–126)
HBA1C MFR BLD: 5.5 % (ref ?–5.7)

## 2023-10-24 ENCOUNTER — TELEPHONE (OUTPATIENT)
Dept: FAMILY MEDICINE CLINIC | Facility: CLINIC | Age: 63
End: 2023-10-24

## 2023-10-24 NOTE — TELEPHONE ENCOUNTER
OV notes from 7/17/23  F/u HTN  Was on lisinopril and amlodipine. Did not experience any medication side effects  Stopped > 6 months ago  Asymptomatic    Fasting BW ordered: Lipids, CMP, CBC, TSH, HbA1c and PSA. Vaccines: Shingrix declined  Hypertension: Controlled. Restart previous medications. Patient agrees. Advised on weight management, low-salt diet  The patient is asked to return for CPX in 1 year. Is due for a blood pressure follow up visit  Patient advised. Verbalized understanding.    Future Appointments   Date Time Provider Sherly Encarnacion   10/30/2023  3:45 PM Tano Santamaria MD [facility]

## 2023-10-24 NOTE — TELEPHONE ENCOUNTER
Pt was seen for a Px in July with Dr Hema Blair,  He wants to know why dr Wilberto Marquez prescribed AmLODIPine, states he was taking Trazodone in the past.    Also, RX was sent in July,  pt did not pick these up, and will need to be resent to [facility] in [place] on [address]

## (undated) DEVICE — DISSECTOR SONICISION CORDLESS

## (undated) DEVICE — THE ECHELON, ECHELON ENDOPATH™ AND ECHELON FLEX™ FAMILIES OF ENDOSCOPIC LINEAR CUTTERS AND RELOADS ARE STERILE, SINGLE PATIENT USE INSTRUMENTS THAT SIMULTANEOUSLY CUT AND STAPLE TISSUE. THERE ARE SIX STAGGERED ROWS OF STAPLES, THREE ON EITHER SIDE OF THE CUT LINE. THE 45 MM INSTRUMENTS HAVE A STAPLE LINE THATIS APPROXIMATELY 45 MM LONG AND A CUT LINE THAT IS APPROXIMATELY 42 MM LONG. THE SHAFT CAN ROTATE FREELY IN BOTH DIRECTIONS AND AN ARTICULATION MECHANISM ON ARTICULATING INSTRUMENTS ENABLES BENDING THE DISTAL PORTIONOF THE SHAFT TO FACILITATE LATERAL ACCESS OF THE OPERATIVE SITE.THE INSTRUMENTS ARE SHIPPED WITHOUT A RELOAD AND MUST BE LOADED PRIOR TO USE. A STAPLE RETAINING CAP ON THE RELOAD PROTECTS THE STAPLE LEG POINTS DURING SHIPPING AND TRANSPORTATION. THE INSTRUMENTS’ LOCK-OUT FEATURE IS DESIGNED TO PREVENT A USED RELOAD FROM BEING REFIRED.: Brand: ECHELON ENDOPATH

## (undated) DEVICE — Device

## (undated) DEVICE — KENDALL SCD EXPRESS SLEEVES, KNEE LENGTH, MEDIUM: Brand: KENDALL SCD

## (undated) DEVICE — GAMMEX® NON-LATEX PI TEXTURED SIZE 7.5, STERILE POLYISOPRENE POWDER-FREE SURGICAL GLOVE: Brand: GAMMEX

## (undated) DEVICE — CAUTERY PENCIL

## (undated) DEVICE — CHLORAPREP 26ML APPLICATOR

## (undated) DEVICE — TROCARS: Brand: KII® BALLOON BLUNT TIP SYSTEM

## (undated) DEVICE — LAP CHOLE/APPY CDS-LF: Brand: MEDLINE INDUSTRIES, INC.

## (undated) DEVICE — THE ECHELON FLEX POWERED PLUS ARTICULATING ENDOSCOPIC LINEAR CUTTERS ARE STERILE, SINGLE PATIENT USE INSTRUMENTS THAT SIMULTANEOUSLYCUT AND STAPLE TISSUE. THERE ARE SIX STAGGERED ROWS OF STAPLES, THREE ON EITHER SIDE OF THE CUT LINE. THE ECHELON FLEX 45 POWERED PLUSINSTRUMENTS HAVE A STAPLE LINE THAT IS APPROXIMATELY 45 MM LONG AND A CUT LINE THAT IS APPROXIMATELY 42 MM LONG. THE SHAFT CAN ROTATE FREELYIN BOTH DIRECTIONS AND AN ARTICULATION MECHANISM ENABLES THE DISTAL PORTION OF THE SHAFT TO PIVOT TO FACILITATE LATERAL ACCESS TO THE OPERATIVESITE.THE INSTRUMENTS ARE PACKAGED WITH A PRIMARY LITHIUM BATTERY PACK THAT MUST BE INSTALLED PRIOR TO USE. THERE ARE SPECIFIC REQUIREMENTS FORDISPOSING OF THE BATTERY PACK. REFER TO THE BATTERY PACK DISPOSAL SECTION.THE INSTRUMENTS ARE PACKAGED WITHOUT A RELOAD AND MUST BE LOADED PRIOR TO USE. A STAPLE RETAINING CAP ON THE RELOAD PROTECTS THE STAPLE LEGPOINTS DURING SHIPPING AND TRANSPORTATION. THE INSTRUMENTS’ LOCK-OUT FEATURE IS DESIGNED TO PREVENT A USED OR IMPROPERLY INSTALLED RELOADFROM BEING REFIRED OR AN INSTRUMENT FROM BEING FIRED WITHOUT A RELOAD.: Brand: ECHELON FLEX

## (undated) DEVICE — TROCAR: Brand: KII® SLEEVE

## (undated) DEVICE — VIOLET BRAIDED (POLYGLACTIN 910), SYNTHETIC ABSORBABLE SUTURE: Brand: COATED VICRYL

## (undated) DEVICE — TISSUE RETRIEVAL SYSTEM: Brand: INZII RETRIEVAL SYSTEM

## (undated) DEVICE — TROCAR: Brand: KII SHIELDED BLADED ACCESS SYSTEM

## (undated) DEVICE — SUTURE MONOCRYL 4-0 PS-2

## (undated) DEVICE — SOL  .9 1000ML BTL

## (undated) DEVICE — #15 STERILE STAINLESS BLADE: Brand: STERILE STAINLESS BLADES

## (undated) NOTE — LETTER
July 10, 2019     [address] [address]      Dear Lisa Pattonr: Our records show you are due for a general exam and blood pressure follow-up in our office. Please contact us at [phone] to schedule. Thank you!

## (undated) NOTE — LETTER
10/22/21        Hema Reasons  [address] [facility]      Dear Niles Latham,    Our records indicate that you have outstanding lab work and or testing that was ordered for you and has not yet been completed:     To provide you with the best poss

## (undated) NOTE — LETTER
[facility] Department  Phone: [phone]  OUTSIDE TESTING RESULT REQUEST      TO:   Dr. Samual Pallas  Today's Date: 1/30/20    FAX #: [phone]     IMPORTANT: FOR YOUR IMMEDIATE ATTENTION  Please FAX all test results listed below to: 61

## (undated) NOTE — Clinical Note
Arik Clarke saw Brigid Puga in the office today. He presents for his first screening colonoscopy. We will schedule him in the next few weeks.   Thank you Remi Lanes

## (undated) NOTE — LETTER
01/20/20        Rondal Mode  [address]  [place] [address]      Dear So Moralez,    Our records indicate that you have outstanding lab work and or testing that was ordered for you and has not yet been completed:  Orders Placed This Encounter